# Patient Record
Sex: MALE | NOT HISPANIC OR LATINO | Employment: FULL TIME | URBAN - NONMETROPOLITAN AREA
[De-identification: names, ages, dates, MRNs, and addresses within clinical notes are randomized per-mention and may not be internally consistent; named-entity substitution may affect disease eponyms.]

---

## 2017-06-15 ENCOUNTER — HISTORY (OUTPATIENT)
Dept: EMERGENCY MEDICINE | Facility: OTHER | Age: 52
End: 2017-06-15

## 2017-06-23 ENCOUNTER — OFFICE VISIT - GICH (OUTPATIENT)
Dept: PEDIATRICS | Facility: OTHER | Age: 52
End: 2017-06-23

## 2017-06-23 ENCOUNTER — HISTORY (OUTPATIENT)
Dept: PEDIATRICS | Facility: OTHER | Age: 52
End: 2017-06-23

## 2017-06-23 DIAGNOSIS — N40.0 ENLARGED PROSTATE WITHOUT LOWER URINARY TRACT SYMPTOMS (LUTS): ICD-10-CM

## 2017-06-23 DIAGNOSIS — Z13.0 ENCOUNTER FOR SCREENING FOR DISEASES OF THE BLOOD AND BLOOD-FORMING ORGANS AND CERTAIN DISORDERS INVOLVING THE IMMUNE MECHANISM: ICD-10-CM

## 2017-06-23 DIAGNOSIS — R05.9 COUGH: ICD-10-CM

## 2017-06-23 DIAGNOSIS — Z13.1 ENCOUNTER FOR SCREENING FOR DIABETES MELLITUS: ICD-10-CM

## 2017-06-23 DIAGNOSIS — K21.9 GASTRO-ESOPHAGEAL REFLUX DISEASE WITHOUT ESOPHAGITIS: ICD-10-CM

## 2017-06-23 DIAGNOSIS — E78.2 MIXED HYPERLIPIDEMIA: ICD-10-CM

## 2017-06-23 DIAGNOSIS — Z76.89 PERSONS ENCOUNTERING HEALTH SERVICES IN OTHER SPECIFIED CIRCUMSTANCES: ICD-10-CM

## 2017-06-23 DIAGNOSIS — R09.89 OTHER SPECIFIED SYMPTOMS AND SIGNS INVOLVING THE CIRCULATORY AND RESPIRATORY SYSTEMS: ICD-10-CM

## 2017-06-23 DIAGNOSIS — F17.200 NICOTINE DEPENDENCE, UNCOMPLICATED: ICD-10-CM

## 2017-06-23 DIAGNOSIS — Z12.11 ENCOUNTER FOR SCREENING FOR MALIGNANT NEOPLASM OF COLON: ICD-10-CM

## 2017-06-23 LAB
ABSOLUTE BASOPHILS - HISTORICAL: 0 THOU/CU MM
ABSOLUTE EOSINOPHILS - HISTORICAL: 0.1 THOU/CU MM
ABSOLUTE IMMATURE GRANULOCYTES(METAS,MYELOS,PROS) - HISTORICAL: 0.1 THOU/CU MM
ABSOLUTE LYMPHOCYTES - HISTORICAL: 2.5 THOU/CU MM (ref 0.9–2.9)
ABSOLUTE MONOCYTES - HISTORICAL: 0.9 THOU/CU MM
ABSOLUTE NEUTROPHILS - HISTORICAL: 9.6 THOU/CU MM (ref 1.7–7)
ANION GAP - HISTORICAL: 8 (ref 5–18)
BASOPHILS # BLD AUTO: 0.3 %
BUN SERPL-MCNC: 18 MG/DL (ref 7–25)
BUN/CREAT RATIO - HISTORICAL: 21
CALCIUM SERPL-MCNC: 9.3 MG/DL (ref 8.6–10.3)
CHLORIDE SERPLBLD-SCNC: 107 MMOL/L (ref 98–107)
CHOL/HDL RATIO - HISTORICAL: 4.42
CHOLESTEROL TOTAL: 199 MG/DL
CO2 SERPL-SCNC: 24 MMOL/L (ref 21–31)
CREAT SERPL-MCNC: 0.86 MG/DL (ref 0.7–1.3)
EOSINOPHIL NFR BLD AUTO: 0.6 %
ERYTHROCYTE [DISTWIDTH] IN BLOOD BY AUTOMATED COUNT: 13 % (ref 11.5–15.5)
GFR IF NOT AFRICAN AMERICAN - HISTORICAL: >60 ML/MIN/1.73M2
GLUCOSE SERPL-MCNC: 98 MG/DL (ref 70–105)
HCT VFR BLD AUTO: 43.9 % (ref 37–53)
HDLC SERPL-MCNC: 45 MG/DL (ref 23–92)
HEMOGLOBIN: 14.6 G/DL (ref 13.5–17.5)
IMMATURE GRANULOCYTES(METAS,MYELOS,PROS) - HISTORICAL: 0.5 %
LDLC SERPL CALC-MCNC: 118 MG/DL
LYMPHOCYTES NFR BLD AUTO: 18.7 % (ref 20–44)
MCH RBC QN AUTO: 30.3 PG (ref 26–34)
MCHC RBC AUTO-ENTMCNC: 33.3 G/DL (ref 32–36)
MCV RBC AUTO: 91 FL (ref 80–100)
MONOCYTES NFR BLD AUTO: 6.9 %
NEUTROPHILS NFR BLD AUTO: 73 % (ref 42–72)
NON-HDL CHOLESTEROL - HISTORICAL: 154 MG/DL
PATIENT STATUS - HISTORICAL: ABNORMAL
PLATELET # BLD AUTO: 305 THOU/CU MM (ref 140–440)
PMV BLD: 9.6 FL (ref 6.5–11)
POTASSIUM SERPL-SCNC: 4.5 MMOL/L (ref 3.5–5.1)
PSA TOTAL (SCREEN) - HISTORICAL: 4.51 NG/ML
RED BLOOD COUNT - HISTORICAL: 4.82 MIL/CU MM (ref 4.3–5.9)
SODIUM SERPL-SCNC: 139 MMOL/L (ref 133–143)
TRIGL SERPL-MCNC: 180 MG/DL
WHITE BLOOD COUNT - HISTORICAL: 13.2 THOU/CU MM (ref 4.5–11)

## 2017-06-26 ENCOUNTER — COMMUNICATION - GICH (OUTPATIENT)
Dept: SURGERY | Facility: OTHER | Age: 52
End: 2017-06-26

## 2017-06-26 DIAGNOSIS — Z12.11 ENCOUNTER FOR SCREENING FOR MALIGNANT NEOPLASM OF COLON: ICD-10-CM

## 2017-07-05 ENCOUNTER — HOSPITAL ENCOUNTER (OUTPATIENT)
Dept: RESPIRATORY THERAPY | Facility: OTHER | Age: 52
End: 2017-07-05
Attending: INTERNAL MEDICINE

## 2017-07-05 DIAGNOSIS — R09.89 OTHER SPECIFIED SYMPTOMS AND SIGNS INVOLVING THE CIRCULATORY AND RESPIRATORY SYSTEMS: ICD-10-CM

## 2017-07-05 DIAGNOSIS — F17.200 NICOTINE DEPENDENCE, UNCOMPLICATED: ICD-10-CM

## 2017-07-05 DIAGNOSIS — R05.9 COUGH: ICD-10-CM

## 2017-07-20 ENCOUNTER — HOSPITAL ENCOUNTER (OUTPATIENT)
Dept: SURGERY | Facility: OTHER | Age: 52
Discharge: HOME | End: 2017-07-20
Attending: SURGERY | Admitting: SURGERY

## 2017-07-20 ENCOUNTER — SURGERY (OUTPATIENT)
Dept: SURGERY | Facility: OTHER | Age: 52
End: 2017-07-20

## 2017-07-20 ENCOUNTER — HISTORY (OUTPATIENT)
Dept: SURGERY | Facility: OTHER | Age: 52
End: 2017-07-20

## 2017-07-25 ENCOUNTER — COMMUNICATION - GICH (OUTPATIENT)
Dept: SURGERY | Facility: OTHER | Age: 52
End: 2017-07-25

## 2017-07-25 ENCOUNTER — HISTORY (OUTPATIENT)
Dept: SURGERY | Facility: OTHER | Age: 52
End: 2017-07-25

## 2017-12-28 NOTE — OR ANESTHESIA
"Patient Information     Patient Name MRN Sex     Marvin Chavarria [id number] Male 1965      OR Anesthesia by Alysa Rosas CRNA at 2017 11:53 AM     Author:  Alysa Rosas CRNA Service:  (none) Author Type:  NURS- Nurse Anesthetist     Filed:  2017 11:54 AM Date of Service:  2017 11:53 AM Status:  Signed     :  Alysa Rosas CRNA (NURS- Nurse Anesthetist)            Anesthesia Post Operative Care Note    Name: Marvin Chavarria  MRN:   [id number]  :    1965       Procedure Done:  See Surgeon Note   Case Cancelled for Anesthetic Reason:  No      Anesthesia Technique    Anesthetic Type:  MAC       MAC Type:  NC     Oral Trauma:  No    Intraoperative Course   Hemodynamics:  Stable  Post Op Heart:  No Post Op Heart        Echocardiogram (if \"Yes\" - see report):  No    Ventilation Normal:  Yes Lung Sounds:  Normal      PACU Course      Nondepolarizer Used: No    Reintubation:  No   Hemodynamics:  Stable      Hydration: Euvolemic   Temperature:  36.1 - 38.3      Mental Status:  Awake, alert, follows commands   Pain Management:  Adequate   Regional Block:  No   Anesthesia Complications:  None      Vital Signs:  Temp: (P) 98  F (36.7  C)  Pulse: 98  BP: 131/75  Resp: 18  SpO2: 97 %                       Active Lines:  Patient Lines/Drains/Airways Status    Active Line     None                Intake & Output:  Date  17 07 - 17 0659(Not Admitted)    17 07 - 17 0659      Shift  3322-8934 6080-9957 2193-2175 24 Hour Total 1069-9088 1352-5499 9505-5448 24 Hour Total   I  N  T  A  K  E   Shift Total           O  U  T  P  U  T   Other     200   200       +I/O+    Liquid Stool     200   200    Shift Total     200   200   NET      -200   -200   Weight (kg)                  Labs:  No results for input(s): JB1JACHGZAM, VKU3AQKEAHTL, PHARTERIAL, BSQ5DQBZJEBK, O8GKXUDIRCRM in the last 24 hours.    No results for input(s): MAGNESIUM in the last 24 hours.    No results " for input(s): GLUCOSEMETER in the last 720 hours.        Alysa Rosas CRNA ....................  7/20/2017   11:53 AM

## 2017-12-28 NOTE — OR POSTOP
Patient Information     Patient Name MRN Sex Marvin Macario [id number] Male 1965      OR PostOp by Erin Sotelo RN at 2017 12:20 PM     Author:  Erin Sotelo RN Service:  (none) Author Type:  NURS- Registered Nurse     Filed:  2017 12:21 PM Date of Service:  2017 12:20 PM Status:  Signed     :  Erin Sotelo RN (NURS- Registered Nurse)            Discharge Note    Data:  Marvin Chavarria has been discharged home at 1220 via ambulatory accompanied by Registered Nurse.      Action:  Written discharge/follow-up instructions were provided to patient. Prescriptions : None.  Belongings sent with patient. Medications from home sent with patient/family: Not Applicable  Equipment none .     Response:  Patient verbalized understanding of discharge instructions, reason for discharge, and necessary follow-up appointments.       Erin Sotelo RN

## 2017-12-28 NOTE — PROCEDURES
Patient Information     Patient Name MRN Sex Marvin Macario [id number] Male 1965      Procedures by Rachel Cardona MD at 2017 11:47 AM     Author:  Rachel Cardona MD Service:  (none) Author Type:  Physician     Filed:  2017 11:53 AM Date of Service:  2017 11:47 AM Status:  Signed     :  Rachel Cardona MD (Physician)        Pre-procedure Diagnoses:    1. Special screening for malignant neoplasms, colon [Z12.11]           Post-procedure Diagnoses:    1. Polyp of cecum [D12.0]    2. Polyp of transverse colon, unspecified type [D12.3]    3. Polyp of sigmoid colon, unspecified type [D12.5]    4. Diverticulosis of colon without diverticulitis [K57.30]           Procedures:    1. VT COLONOSCOPY REMOVE KEITH POLYP LESN HOT BPSY FORCEP [75353.0]               PROCEDURE NOTE    SURGEON: Rachel Cardona MD.    PRE-OP DIAGNOSIS:  Screening Colonoscopy      POST-OP DIAGNOSIS: colon polyps -cecum, hepatic flexure and sigmoid colon    PROCEDURE:  Colonoscopy with polypectomies with hot forceps    ESTIMATED BLOOD LOSS: none    COMPLICATIONS:  None    SPECIMEN:  Cecal, hepatic flexure and sigmoid polyps    ANESTHESIA:  See anesthesia note    INDICATION FOR THE PROCEDURE: The patient is a 51 y.o. male. The patient has no complaints. I explained to the patient the risks, benefits and alternatives to screening colonoscopy for evaluating the colon for colon polyps and colon cancer. We specifically discussed the risks of bleeding, infection, perforation, potential inability to reach the cecum and the risks of sedation. The patient's questions were answered and the patient wished to proceed. Informed consent paperwork was completed.    PROCEDURE: The patient was taken to the endoscopy suite. Appropriate monitors were attached. The patient was placed in the left lateral decubitus position.Timeout was performed confirming the patient's identity and procedure to be performed.  After appropriate sedation was  confirmed, digital rectal exam was performed.  There was normal tone and no gross abnormality was noted. The lubricated colonoscope was introduced into the anus the colon was insufflated with air. The prep quality was adequate. Under direct visualization the scope was advanced to the cecum. The ileocecal valve was intubated and the terminal ileum inspected. No gross abnormality was noted. The scope was withdrawn back into the cecum. Three small flat polyps were noted in the cecum and removed with hot forceps. The mucosa of colon was inspected while withdrawing the scope. Two less than 1 cm flat polyps were noted at the hepatic flexure and removed with hot forceps. A small polyp-less than 5 mm  was noted in the sigmoid colon and removed with hot forceps. The scope was retroflexed in the rectum and the anorectal junction was inspected. No abnormalities were noted. The scope was returned to a neutral position and the colon was decompressed. The scope was removed. The patient tolerated the procedure with no immediately apparent complication. The patient was taken to recovery in stable condition.      FOLLOW UP:  RECOMMEND high fiber diet, follow up: will call with pathology results.    Rachel Cardona MD

## 2017-12-28 NOTE — PROGRESS NOTES
Patient Information     Patient Name MRN Marvin Jimenez [id number] Male 1965      Progress Notes by Lacho Lizama MD at 2017  2:00 PM     Author:  Lacho Lizama MD Service:  (none) Author Type:  Physician     Filed:  2017  4:53 PM Encounter Date:  2017 Status:  Signed     :  Lacho Lizama MD (Physician)            Preventive Exam      SUBJECTIVE:    Mr. Marvin Chavarria is a 51 y.o. male who presents for a routine preventive physical exam.  He has no symptomatic complaints or other concerns.    Preventive Health Assessment:    Preventive Health screen was reviewed and updated today in EMR  Calcium intake is adequate.     Exercise is adequate.  Seatbelts are used at all times.  Tetanus immunization is up-to-date.  Colon cancer screening is due.    Review of Systems:  Constitutional: Normal  Eyes: Normal  Ears/nose/mouth/throat: Normal  Cardiology/vascular: Normal  Respiratory: Normal  Psychiatric: Normal  GI: He gets heartburn off and on. Again he attributes this to coffee. He's been taking Pepcid over-the-counter which has been helpful.  : He gets up to urinate 2-3 times per night. He's always attributed to drinking 2 pots of coffee. He works night shift so his day night schedule is unusual. When he first wakes up it can be hard to urinate. His stream has decreased particularly when he wakes up.  Musculoskeletal: Normal  Neurological: Normal  Endocrine: Normal  Hematological/lymphatic: Normal  Integumentary: He recently lacerated his ring finger while working with his boat. He was seen in the ER and had it sutured. He is not due to have the sutures removed until next week, he plans to come back for that. Is no drainage or erythema.  Allergy/immunizations: Normal      Past Medical History:    Patient Active Problem List     Diagnosis  Code     Gastroesophageal reflux disease K21.9     Bilateral carotid bruits R09.89     Tobacco use disorder F17.200     Benign prostatic  "hyperplasia N40.0     Past Medical History:     Diagnosis  Date     Nicotine use disorder      Past Surgical History:    Past Surgical History:      Procedure  Laterality Date     WISDOM TEETH EXTRACTION  1981       Allergies     Allergen  Reactions     Ibuprofen Chest Pain       Current Outpatient Prescriptions on File Prior to Visit       Medication  Sig Dispense Refill     ASCORBATE CALCIUM (VITAMIN C ORAL) Take  by mouth.       HYDROcodone-acetaminophen, 5-325 mg, (NORCO) per tablet Take 1-2 tablets by mouth every 6 hours if needed  for Pain Max acetaminophen dose: 4000 mg in 24 hrs. 30 tablet 0     multivitamin capsule Take 1 capsule by mouth once daily.       ZINC ORAL Take  by mouth.       No current facility-administered medications on file prior to visit.        Family History:   Family History       Problem   Relation Age of Onset     Other  Father      BPH       Heart Disease  Father 85     AMI       Dementia  Mother      Arthritis  Mother      Other  Brother      enlarged prostate       Other  Brother      x3 with BPH         Diabetes  Brother      Cancer-prostate  No Family History      Cancer-colon  No Family History        Social History:    Social History      Substance Use Topics        Smoking status:  Current Every Day Smoker     Packs/day: 1.00     Types: Cigarettes     Smokeless tobacco:  Never Used     Alcohol use  No     Social History Narrative    , 2 children    works nights at the "[employer] Yuko Chavarria        OBJECTIVE:    /82  Pulse 90  Ht 1.626 m (5' 4\")  Wt 69.3 kg (152 lb 12.8 oz)  BMI 26.23 kg/m2 Body mass index is 26.23 kg/(m^2).  Gen: Pleasant male, NAD.  HEENT: MMM, no OP erythema.  Neck: Bilateral carotid bruits, no thyromegaly.  No cervical lymphadenopathy.  CV: RRR no m/r/g.   Pulm: CTAB no w/r/r  Abd: Soft, NT, ND. No HSM. No masses. Bowel sounds present.  : circumcised adult male.  Testicles non-tender, descended bilat.  No masses. No inguinal " hernias.  Neuro: Grossly intact  Ext: No lower extremity edema.  Skin: No concerning lesions.  Psychiatric: Normal affect and insight. Does not appear anxious or depressed.    Diabetes Labs  Lab Results      Component  Value Date/Time    CHOL 199 06/23/2017 02:42 PM    HDL 45 06/23/2017 02:42 PM    LDLCHOL 118 (H) 06/23/2017 02:42 PM    TRIGLYCERIDE 180 (H) 06/23/2017 02:42 PM    CREATININE 0.86 06/23/2017 02:42 PM         ASSESSMENT:      ICD-10-CM    1. Encounter to establish care Z76.89    2. Benign prostatic hyperplasia, presence of lower urinary tract symptoms unspecified, unspecified morphology N40.0 PSA TOTAL SCREEN      tamsulosin (FLOMAX) 0.4 mg capsule      PSA TOTAL SCREEN   3. Mixed hyperlipidemia E78.2 LIPID PANEL      LIPID PANEL   4. Diabetes mellitus screening Z13.1 BASIC METABOLIC PANEL      BASIC METABOLIC PANEL   5. Screening, iron deficiency anemia Z13.0 CBC WITH DIFFERENTIAL      CBC WITH DIFFERENTIAL      CBC WITH AUTO DIFFERENTIAL   6. Tobacco use disorder F17.200 SPIROMETRY W/ BRONCHODILATOR   7. Colon cancer screening Z12.11 COLONOSCOPY SCREENING-GICH   8. Gastroesophageal reflux disease, esophagitis presence not specified K21.9 famotidine (PEPCID) 10 mg tablet   9. Bilateral carotid bruits R09.89 US CAROTID DUPLEX BILATERAL   10. Cough R05 SPIROMETRY W/ BRONCHODILATOR     strongly encouraged tobacco cessation. Some information was provided below. Requested he come back for repeat visit if he desires tobacco cessation.    PLAN:     -- Screening labs ordered (see above)   -- Colonoscopy: Schedule   -- Prostate: PSA today   -- Discussed healthy eating, active lifestyle   -- Medications discussed   -- Follow-up in one year, sooner PRN any concerns.  Patient Instructions    -- Think about quitting smoking   -- Daily exercise   -- Okay to use famotidine   -- Try to cut back on caffeine   -- Trial of Flomax if PSA is roughly the same         -- Choose a quit date (within 1 month). Quitting smoking  abruptly is more successful than gradually cutting back.   -- Tell everyone about it (friends, family, coworkers)   -- Think about when you smoke the most, and what you'll do during those times (eg when in the car, work breaks, etc)     -- Start varenicline (Chantix) 1 week before your quit date   -- Start bupropion (Wellbutrin/Zyban) 1 week before your quit date -- Welbutrin 1 pill daily for 1 week then 1 pill twice daily     -- Stop smoking on quit date   -- Starting with quit date, use nicotine lozenges/gum as needed for cravings   -- Quit Plan   [phone] ([phone])   http://www.quitplan.com   -- http://smokefree.gov/      SignedLacho MD  Internal Medicine & Pediatrics

## 2017-12-28 NOTE — PATIENT INSTRUCTIONS
Patient Information     Patient Name MRMarvin Maldonado [id number] Male 1965      Patient Instructions by Lacho Lizama MD at 2017  2:00 PM     Author:  Lacho Lizama MD  Service:  (none) Author Type:  Physician     Filed:  2017  2:29 PM  Encounter Date:  2017 Status:  Addendum     :  Lacho Lizama MD (Physician)        Related Notes: Original Note by Lacho Lizama MD (Physician) filed at 2017  2:26 PM             -- Think about quitting smoking   -- Daily exercise   -- Okay to use famotidine   -- Try to cut back on caffeine   -- Trial of Flomax if PSA is roughly the same         -- Choose a quit date (within 1 month). Quitting smoking abruptly is more successful than gradually cutting back.   -- Tell everyone about it (friends, family, coworkers)   -- Think about when you smoke the most, and what you'll do during those times (eg when in the car, work breaks, etc)     -- Start varenicline (Chantix) 1 week before your quit date   -- Start bupropion (Wellbutrin/Zyban) 1 week before your quit date -- Welbutrin 1 pill daily for 1 week then 1 pill twice daily     -- Stop smoking on quit date   -- Starting with quit date, use nicotine lozenges/gum as needed for cravings   -- Quit Plan   [phone] ([phone])   http://www.quitplan.com   -- http://smokefree.gov/

## 2017-12-28 NOTE — OR NURSING
Patient Information     Patient Name MRN Sex Marvin Macario [id number] Male 1965      OR Nursing by Rakel Wilson RN at 2017 11:12 AM     Author:  Rakel Wilson RN Service:  (none) Author Type:  NURS- Registered Nurse     Filed:  2017 11:12 AM Date of Service:  2017 11:12 AM Status:  Signed     :  Rakel Wilson RN (NURS- Registered Nurse)            Received preop report from Devi Vargas RN.

## 2017-12-28 NOTE — TELEPHONE ENCOUNTER
Patient Information     Patient Name MRN Sex Marvin Macario [id number] Male 1965      Telephone Encounter by Layla Bird at 2017  9:07 AM     Author:  Layla Bird Service:  (none) Author Type:  (none)     Filed:  2017  9:12 AM Encounter Date:  2017 Status:  Signed     :  Layla Bird            Screening Questions for the Scheduling of Screening Colonoscopies   (If Colonoscopy is diagnostic, Provider should review the chart before scheduling.)  Are you younger than 50 or older than 80?  NO   Do you take aspirin or fish oil?  NO (if yes, tell patient to stop 1 week prior to Colonoscopy)  Do you take warfarin (Coumadin), clopidogrel (Plavix), apixaban (Eliquis), dabigatram (Pradaxa), rivaroxaban (Xarelto) or any blood thinner? NO  Do you use oxygen at home?  NO  Do you have kidney disease? NO  Are you on dialysis? NO  Have you had a stroke or heart attack in the last year? NO  Have you had a stent in your heart or any blood vessel in the last year? NO  Have you had a transplant of any organ? NO  Have you had a colonoscopy or upper endoscopy (EGD) before? NO          When?  NO  Date of scheduled Colonoscopy. 2017  Provider JULIO HALE

## 2017-12-28 NOTE — OR ANESTHESIA
Patient Information     Patient Name MRN Sex     Marvin Chavarria [id number] Male 1965      OR Anesthesia by Alysa Rosas CRNA at 2017 11:04 AM     Author:  Alysa Rosas CRNA Service:  (none) Author Type:  NURS- Nurse Anesthetist     Filed:  2017 11:04 AM Date of Service:  2017 11:04 AM Status:  Signed     :  Alysa Rosas CRNA (NURS- Nurse Anesthetist)                                                           ANESTHESIA ASSESSMENT    Date: 17 Time: 11:04 AM      Patient:  Marvin Chavarria    Procedure(s) (LRB):  COLONOSCOPY (N/A)    Past Medical History:     Diagnosis  Date     Nicotine use disorder        Past Surgical History:      Procedure  Laterality Date     WISDOM TEETH EXTRACTION         Family History       Problem   Relation Age of Onset     Other  Father      BPH       Heart Disease  Father 85     AMI       Dementia  Mother      Arthritis  Mother      Other  Brother      enlarged prostate       Other  Brother      x3 with BPH         Diabetes  Brother      Cancer-prostate  No Family History      Cancer-colon  No Family History        Patient Active Problem List     Diagnosis  Code     Gastroesophageal reflux disease K21.9     Bilateral carotid bruits R09.89     Tobacco use disorder F17.200     Benign prostatic hyperplasia N40.0     Special screening for malignant neoplasms, colon Z12.11       Prescriptions Prior to Admission       Medication  Sig Dispense Refill     ASCORBATE CALCIUM (VITAMIN C ORAL) Take  by mouth.       famotidine (PEPCID) 10 mg tablet Take 1 tablet by mouth 2 times daily.  0     multivitamin capsule Take 1 capsule by mouth once daily.       tamsulosin (FLOMAX) 0.4 mg capsule Take 1 capsule by mouth once daily after a meal. 90 capsule 4     ZINC ORAL Take  by mouth.         Allergies:  Allergies     Allergen  Reactions     Ibuprofen Chest Pain       Review of Systems:  GERD: Yes (controlled with mes)  Chest pain: No  Shortness of breath:  No  Recent fever: No  Poor exercise tolerance: No  Bleeding tendency: No  Pregnant: No  Anesthesia Complications: None      History     Smoking Status       Current Every Day Smoker      Packs/day: 1.00     Types: Cigarettes   Smokeless Tobacco       Never Used      Social History     Social History        Marital status:       Spouse name: Yuko     Number of children:  2     Years of education:  12+     Occupational History       supervisor [employer]     Social History Main Topics        Smoking status:  Current Every Day Smoker     Packs/day: 1.00     Types: Cigarettes     Smokeless tobacco:  Never Used     Alcohol use  No     Drug use:  No     Sexual activity:  Yes     Partners: Female     Other Topics  Concern     None      Social History Narrative     , 2 children    works nights at the [employer]    [place] Chavarria       Physical Examination:  /75  Pulse 98  Temp 98.6  F (37  C)  Resp 18  SpO2 97% There is no height or weight on file to calculate BMI. There is no height or weight on file to calculate BSA.  Dental Condition: Good     Mallampati Score (Airway): II  Cardiovascular: Normal  Pulmonary: Normal  Other: N/A    Recent Labs in [facility]:    No results for input(s): SODIUM, POTASSIUM, CHLORIDE, QS3SPBBT, ANIONGAP, BUN, CREATININE, BUNCREARATIO, CALCIUM, GLUCOSE, GLUCOSEMETER, KETONES, MAGNESIUM, WBC, HGB, HCT, PLT, ABORH, RHTYPE, PREGURINE, BHCGQL, HCGBETAQUANT, INR in the last 72 hours.          Assessment/Plan:  ASA Class: II  Risk of dental injury discussed: Yes  NPO status confirmed: Yes  Anesthetic Plan: MAC  Risk/Benefit/Alt discussed: Yes  Questions answered: Yes  Emergency Case?: No  Labs/ECG/Radiology Reviewed?: Yes      H&P Reviewed.  Patient Examined.      Provider Electronic Signature:  Alysa Rosas CRNA

## 2017-12-29 NOTE — H&P
Patient Information     Patient Name MRN Sex Marvin Macario [id number] Male 1965      H&P by Rachel Cardona MD at 2017 10:59 AM     Author:  Rachel Cardona MD Service:  (none) Author Type:  Physician     Filed:  2017 11:00 AM Date of Service:  2017 10:59 AM Status:  Signed     :  Rachel Cardona MD (Physician)            PRE-PROCEDURE NOTE    CHIEF COMPLAINT / REASON FOR PROCEDURE:  Need for screening colonoscopy.    PERTINENT HISTORY   Patient with no complaints. Previous colonoscopy none. No diarrhea, constipation, abdominal pain or rectal bleeding. No family history of colon polyps or colon cancer.    Past Medical History:     Diagnosis  Date     Nicotine use disorder      Past Surgical History:      Procedure  Laterality Date     WISDOM TEETH EXTRACTION       Other:  None  Bleeding tendencies:  No    Relevant Family History:  None    Relevant Social History:  None    A relevant review of systems was performed and was negative.    ALLERGIES/SENSITIVITIES:   Allergies     Allergen  Reactions     Ibuprofen Chest Pain        CURRENT MEDICATIONS:    Current Facility-Administered Medications        Medication  Dose Route Frequency Last Rate     lactated Ringers infusion  25 mL/hr Intravenous continuous 25 mL/hr (17 1030)     lidocaine (1%) injection 0.1-1 mL  0.1-1 mL Intra-Dermal one time prn       sodium chloride 0.9% 5 mL syringe (NORMAL SALINE)  5 mL Intravenous Each Time PRN        Prior to Admission medications          Medication Sig Start Date End Date Taking? Last Dose Authorizing Provider   ASCORBATE CALCIUM (VITAMIN C ORAL) Take  by mouth.    17 am Reported, Patient   famotidine (PEPCID) 10 mg tablet Take 1 tablet by mouth 2 times daily. 17 at 0700 Lacho Lizama MD   multivitamin capsule Take 1 capsule by mouth once daily.    17 am Reported, Patient   tamsulosin (FLOMAX) 0.4 mg capsule Take 1 capsule by mouth once daily after a meal.  6/23/17 7/19/2017 at 2000 Lacho Lizama MD   ZINC ORAL Take  by mouth.    7/13/17 am Reported, Patient       PRE-SEDATION ASSESSMENT:    Lung Exam:  Normal  Heart Exam:  Normal    Comment(s):      IMPRESSION:  Need for screening colonoscopy.    PLAN:  I discussed screening colonoscopy with the patient.    Rachel Cardona MD

## 2017-12-30 NOTE — NURSING NOTE
Patient Information     Patient Name MRN Marvin Jimenez [id number] Male 1965      Nursing Note by Evette Fritz at 2017  2:00 PM     Author:  Evette Fritz Service:  (none) Author Type:  (none)     Filed:  2017  2:15 PM Encounter Date:  2017 Status:  Signed     :  Evette Fritz            Patient presents to clinic to establish care today.  Has concerns with elevated PSA levels over the last few years and for over a month.  Evette Fritz LPN ....................  2017   2:08 PM

## 2018-01-27 VITALS
HEART RATE: 90 BPM | HEIGHT: 64 IN | BODY MASS INDEX: 26.09 KG/M2 | WEIGHT: 152.8 LBS | SYSTOLIC BLOOD PRESSURE: 130 MMHG | DIASTOLIC BLOOD PRESSURE: 82 MMHG

## 2018-02-01 ENCOUNTER — HISTORY (OUTPATIENT)
Dept: FAMILY MEDICINE | Facility: OTHER | Age: 53
End: 2018-02-01

## 2018-02-01 ENCOUNTER — OFFICE VISIT - GICH (OUTPATIENT)
Dept: FAMILY MEDICINE | Facility: OTHER | Age: 53
End: 2018-02-01

## 2018-02-01 DIAGNOSIS — R50.9 FEVER: ICD-10-CM

## 2018-02-01 DIAGNOSIS — R69 ILLNESS: ICD-10-CM

## 2018-02-01 DIAGNOSIS — J10.1 INFLUENZA DUE TO OTHER IDENTIFIED INFLUENZA VIRUS WITH OTHER RESPIRATORY MANIFESTATIONS: ICD-10-CM

## 2018-02-09 VITALS
WEIGHT: 154.4 LBS | HEART RATE: 86 BPM | RESPIRATION RATE: 20 BRPM | BODY MASS INDEX: 26.36 KG/M2 | TEMPERATURE: 98.6 F | SYSTOLIC BLOOD PRESSURE: 140 MMHG | HEIGHT: 64 IN | DIASTOLIC BLOOD PRESSURE: 80 MMHG

## 2018-02-13 NOTE — PROGRESS NOTES
Patient Information     Patient Name MRN Sex Marvin Macario [id number] Male 1965      Progress Notes by Lillie Sandoval NP at 2018  1:30 PM     Author:  Lillie Sandoval NP Service:  (none) Author Type:  PHYS- Nurse Practitioner     Filed:  2018  2:44 PM Encounter Date:  2018 Status:  Signed     :  Lillie Sandoval NP (PHYS- Nurse Practitioner)            Nursing Notes:   Patricia Ross  2018  2:29 PM  Signed  Patient presents to the clinic for cough and fever that started yesterday. Fevers have been around 101. Took some Tylenol this morning at 1100.  Patricia Ross LPN............................ 2018 2:04 PM     SUBJECTIVE:    Marvin Chavarria is a 52 y.o. male who presents for uri, cough fever.     URI    This is a new problem. The current episode started yesterday. The problem has been gradually worsening. The maximum temperature recorded prior to his arrival was 101 - 101.9 F. The fever has been present for 1 to 2 days. Associated symptoms include congestion, coughing, headaches, a plugged ear sensation and rhinorrhea. Pertinent negatives include no ear pain, rash, sneezing, sore throat, swollen glands, vomiting or wheezing. Associated symptoms comments: He did not get a flu shot this year. . He has tried acetaminophen for the symptoms. The treatment provided mild relief.       Current Outpatient Prescriptions on File Prior to Visit       Medication  Sig Dispense Refill     ASCORBATE CALCIUM (VITAMIN C ORAL) Take  by mouth.       famotidine (PEPCID) 10 mg tablet Take 1 tablet by mouth 2 times daily.  0     multivitamin capsule Take 1 capsule by mouth once daily.       tamsulosin (FLOMAX) 0.4 mg capsule Take 1 capsule by mouth once daily after a meal. 90 capsule 4     ZINC ORAL Take  by mouth.       No current facility-administered medications on file prior to visit.        REVIEW OF SYSTEMS:  Review of Systems   HENT: Positive for congestion and  "rhinorrhea. Negative for ear pain, sneezing and sore throat.    Respiratory: Positive for cough. Negative for wheezing.    Gastrointestinal: Negative for vomiting.   Skin: Negative for rash.   Neurological: Positive for headaches.       OBJECTIVE:  /80 (Cuff Site: Left Arm, Position: Sitting, Cuff Size: Adult Regular)  Pulse 86  Temp 98.6  F (37  C) (Tympanic)  Resp 20  Ht 1.626 m (5' 4\")  Wt 70 kg (154 lb 6.4 oz)  BMI 26.5 kg/m2    EXAM:   Physical Exam   Constitutional: He is well-developed, well-nourished, and in no distress.   HENT:   Head: Normocephalic and atraumatic.   Right Ear: Tympanic membrane and ear canal normal.   Left Ear: Tympanic membrane and ear canal normal.   Nose: Rhinorrhea present.   Mouth/Throat: Uvula is midline, oropharynx is clear and moist and mucous membranes are normal.   Eyes: Conjunctivae are normal.   Neck: Neck supple.   Cardiovascular: Normal rate, regular rhythm and normal heart sounds.    Pulmonary/Chest: Effort normal and breath sounds normal. No respiratory distress. He has no decreased breath sounds. He has no wheezes. He has no rhonchi. He has no rales.   Dry cough noted.    Lymphadenopathy:     He has no cervical adenopathy.   Skin: Skin is warm and dry.   Psychiatric: Mood and affect normal.   Nursing note and vitals reviewed.      ASSESSMENT/PLAN:    ICD-10-CM    1. Fever, unspecified fever cause R50.9 [facility] ONLY INFLUENZA A/B PCR   2. Influenza-like illness R69         Plan:  Lab will be called to patient if he has flu he wants [facility]. Viral illness gone over. Home cares and OTC gone over.       BARBARA ELAM NP ....................  2/1/2018   2:43 PM          "

## 2018-02-13 NOTE — PATIENT INSTRUCTIONS
Patient Information     Patient Name MRN Marvin Jimenez [id number] Male 1965      Patient Instructions by Lillie Sandoval NP at 2018  1:30 PM     Author:  Lillie Sandoval NP Service:  (none) Author Type:  PHYS- Nurse Practitioner     Filed:  2018  2:33 PM Encounter Date:  2018 Status:  Signed     :  Lillie Sandoval NP (PHYS- Nurse Practitioner)            Flu (Influenza)   ________________________________________________________________________  KEY POINTS    Flu is caused by a virus. It can be spread by coughing or sneezing, or by touching something with the virus on it.    The flu vaccine is the best way to help prevent the flu. Washing your hands often is also important. Treatment includes rest, drinking plenty of liquids, and sometimes medicine for pain or fever.    If you have another medical problem and get the flu, it s best to see your healthcare provider.  ________________________________________________________________________  What is flu?  Influenza, also called the flu, is an infection caused by a virus. The flu affects your whole body, especially your air passages, and causes symptoms that are similar to cold symptoms. Flu symptoms tend to be worse than cold symptoms, but it can sometimes be hard to tell the difference between the flu and a cold unless you get a test.  Infection with the flu virus sometimes leads to other infections such as ear, sinus, and chest infections. Pneumonia can also occur as a result of the flu. It can be caused by the flu virus itself or by bacteria infecting lung tissues that have been damaged by the virus. Older adults; people whose immune systems are weak; and people with chronic medical problems, such as heart or lung disease or diabetes, are at risk for more severe symptoms or problems. This is why it s important to try to prevent flu by getting flu shots every year.  What is the cause?  Flu is caused by a virus. When you  have the flu, the virus is in your mucus and saliva and can spread to others when you cough or sneeze. People can also get the flu if they touch something with the flu virus on it (like cups, doorknobs, and hands) and then touch their mouth, nose, or eyes.  Outbreaks of flu occur every year, usually in late fall and winter.  What are the symptoms?  Flu tends to start suddenly. You may feel fine one hour and feel sick the next. Flu symptoms may be different from person to person. Some of the common symptoms include:    Chills, sweating, and fever    Muscle or body aches    Tiredness    Runny or stuffy nose    Headache    Sore throat    Cough  Flu symptoms usually last 3 to 7 days. You may start feeling better after the first 2 days or so.  How is it diagnosed?  Your healthcare provider will ask about your symptoms and examine you. The diagnosis is usually based on your symptoms. There are lab tests for flu, but in most cases there is no need to do a test, especially when many others in your community are sick with the flu.  How is it treated?  Usually you can treat your symptoms at home.    Get plenty of rest.    Drink a lot of clear liquids. Water, broth, juice, electrolyte solutions, and noncaffeinated drinks are best. When you have a high fever, your body needs more liquid because you lose more water in your breath and from your skin. Having enough fluids also helps the mucus in your sinuses and lungs stay thin and easy to clear from the body. When the mucus is thin, it is less likely to cause a sinus or chest infection.    Consider taking acetaminophen or ibuprofen to relieve headaches and muscle aches and to lower a fever. Read the label and take as directed. Unless recommended by your healthcare provider, you should not take these medicines for more than 10 days.    Nonsteroidal anti-inflammatory medicines (NSAIDs), such as ibuprofen, naproxen, and aspirin, may cause stomach bleeding and other problems. These  risks increase with age.    Acetaminophen may cause liver damage or other problems. Unless recommended by your provider, don't take more than 3000 milligrams (mg) in 24 hours. To make sure you don t take too much, check other medicines you take to see if they also contain acetaminophen. Ask your provider if you need to avoid drinking alcohol while taking this medicine.    If your nose or sinuses get congested, a decongestant medicine may help you feel better. Taking a decongestant may help prevent ear or sinus infections.    Cough medicine or cough drops may temporarily help control a cough.  Antiviral medicine is medicine your healthcare provider can prescribe that may make flu symptoms less severe. It may also help you feel better a little sooner. The medicine can be taken as a tablet or nasal spray. It helps only if you start taking it within the first 2 days of illness. Usually it is taken for only a few days. Even if you are taking antiviral medicine, you can pass the flu virus to other people. It is still important to wash your hands often and cover your mouth and nose when you cough or sneeze.  Your healthcare provider may prescribe antiviral medicine if you aren t sick yet but have been exposed to the flu and have not had the flu vaccine.  Talk to your healthcare provider right away if you have symptoms of the flu and:    You have heart disease, asthma, chronic bronchitis, kidney disease, diabetes, or another chronic medical problem    Your immune system does not work normally such as because you are taking steroid medicine for a medical problem    Your symptoms get worse, you have a painful cough, you are coughing up mucus, or you are having trouble breathing. These symptoms can be signs of pneumonia.  Ask your healthcare provider:    How and when you will get your test results    How long it will take to recover from this illness    If there are activities you should avoid, and when you can return to your  normal activities    How to take care of yourself at home    What symptoms or problems you should watch for and what to do if you have them  Make sure you know when you should come back for a checkup. Keep all appointments for provider visits or tests.  How can I help prevent flu?  The flu vaccine is the best way to help prevent the flu. If you do get the flu, the vaccine may help keep you from getting really sick. The flu vaccine is recommended for adults and children 6 months and older. It s especially important for those with a chronic illness.  The flu vaccine is given as a shot in the arm. The nasal spray is not recommended for the 8968-1945 flu season because it has not prevented the disease for the last 3 years.  The shot contains killed virus and is safe for everyone age 6 months and older.  You should get a new flu shot every year because the vaccine wears off over time and because it is changed each year to protect against the current year s most likely flu strains. It s best to get the new vaccine as soon as it s available each year before the start of flu season. However, if the vaccine is still available, it can be helpful to get it anytime during the flu season. Flu season usually starts in October and can last through May.  Flu seasons can vary from region to region. If you are at high risk for infection and plan to travel to an area where you might be exposed to the flu, make sure you have an up-to-date flu shot before you go on your trip.  Other things you can do to help avoid getting the flu are:    Wash your hands often with soap and water. Wash for 20 seconds (long enough to sing the whole  Happy Birthday  song) or use an alcohol-based hand .    Avoid touching your eyes, nose, or mouth when you are out in public.    Stay at least 6 feet away from people who are sick, if you can.    Try to take good care of yourself: Get plenty of sleep, be physically active, manage your stress, drink  plenty of fluids, and eat healthy food. Stop smoking.    Keep surfaces clean--especially bedside tables, surfaces in the bathroom, and toys for children. Some viruses and bacteria can live 2 hours or more on surfaces like cafeteria tables, doorknobs, and desks. Wipe them down with a household disinfectant according to directions on the label.  If you are sick, you can help protect others if you:    Don t go to work or school. Avoid contact with other people except to get medical care.    Cover your nose and mouth with a tissue when you cough or sneeze. Throw the tissue in the trash after you use it, and then wash your hands. If you don t have a tissue, cough or sneeze into your upper sleeve instead of your hands.    Clean your hands often with soap and water or an alcohol-based hand , especially after using tissues or coughing or sneezing into your hands.

## 2018-02-13 NOTE — NURSING NOTE
Patient Information     Patient Name MRN Sex Marvin Macario [id number] Male 1965      Nursing Note by Patricia Ross at 2018  1:30 PM     Author:  Patricia Ross Service:  (none) Author Type:  NURS- Student Practical Nurse     Filed:  2018  2:29 PM Encounter Date:  2018 Status:  Signed     :  Patricia Ross (NURS- Student Practical Nurse)            Patient presents to the clinic for cough and fever that started yesterday. Fevers have been around 101. Took some Tylenol this morning at 1100.  Patricia Ross LPN............................ 2018 2:04 PM

## 2018-02-13 NOTE — ADDENDUM NOTE
Patient Information     Patient Name MRN Marvin Jimenez [id number] Male 1965      Addendum Note by Barbara Sandoval NP at 2018  2:53 PM     Author:  Barbara Sandoval NP Service:  (none) Author Type:  PHYS- Nurse Practitioner     Filed:  2018  2:53 PM Encounter Date:  2018 Status:  Signed     :  Barbara Sandoval NP (PHYS- Nurse Practitioner)       Addended by: BARBARA SANDOVAL on: 2018 02:53 PM        Modules accepted: Orders

## 2018-02-23 ENCOUNTER — DOCUMENTATION ONLY (OUTPATIENT)
Dept: FAMILY MEDICINE | Facility: OTHER | Age: 53
End: 2018-02-23

## 2018-02-23 PROBLEM — N40.0 BENIGN PROSTATIC HYPERPLASIA: Status: ACTIVE | Noted: 2017-06-23

## 2018-02-23 PROBLEM — Z12.11 SPECIAL SCREENING FOR MALIGNANT NEOPLASMS, COLON: Status: ACTIVE | Noted: 2017-07-19

## 2018-02-23 PROBLEM — K21.9 GASTROESOPHAGEAL REFLUX DISEASE: Status: ACTIVE | Noted: 2017-06-23

## 2018-02-23 PROBLEM — F17.200 TOBACCO USE DISORDER: Status: ACTIVE | Noted: 2017-06-23

## 2018-02-23 PROBLEM — R09.89 BILATERAL CAROTID BRUITS: Status: ACTIVE | Noted: 2017-06-23

## 2018-02-23 RX ORDER — MULTIVITAMIN
1 CAPSULE ORAL DAILY
COMMUNITY

## 2018-02-23 RX ORDER — MULTIVIT WITH MINERALS/LUTEIN
TABLET ORAL
COMMUNITY

## 2018-02-23 RX ORDER — FAMOTIDINE 10 MG
10 TABLET ORAL 2 TIMES DAILY
COMMUNITY
Start: 2017-06-23 | End: 2018-07-12

## 2018-02-23 RX ORDER — TAMSULOSIN HYDROCHLORIDE 0.4 MG/1
0.4 CAPSULE ORAL DAILY
COMMUNITY
Start: 2017-06-23 | End: 2018-07-12

## 2018-07-12 ENCOUNTER — OFFICE VISIT (OUTPATIENT)
Dept: PEDIATRICS | Facility: OTHER | Age: 53
End: 2018-07-12
Attending: INTERNAL MEDICINE
Payer: COMMERCIAL

## 2018-07-12 VITALS
WEIGHT: 152 LBS | SYSTOLIC BLOOD PRESSURE: 126 MMHG | DIASTOLIC BLOOD PRESSURE: 80 MMHG | HEART RATE: 88 BPM | HEIGHT: 64 IN | BODY MASS INDEX: 25.95 KG/M2

## 2018-07-12 DIAGNOSIS — Z23 NEED FOR VACCINATION: ICD-10-CM

## 2018-07-12 DIAGNOSIS — F17.200 TOBACCO USE DISORDER: ICD-10-CM

## 2018-07-12 DIAGNOSIS — Z13.1 SCREENING FOR DIABETES MELLITUS: ICD-10-CM

## 2018-07-12 DIAGNOSIS — N40.0 BENIGN PROSTATIC HYPERPLASIA WITHOUT LOWER URINARY TRACT SYMPTOMS: ICD-10-CM

## 2018-07-12 DIAGNOSIS — E78.2 MIXED HYPERLIPIDEMIA: ICD-10-CM

## 2018-07-12 DIAGNOSIS — K21.9 GASTROESOPHAGEAL REFLUX DISEASE, ESOPHAGITIS PRESENCE NOT SPECIFIED: ICD-10-CM

## 2018-07-12 DIAGNOSIS — M54.16 LEFT LUMBAR RADICULOPATHY: ICD-10-CM

## 2018-07-12 DIAGNOSIS — Z00.00 WELL ADULT HEALTH CHECK: Primary | ICD-10-CM

## 2018-07-12 DIAGNOSIS — J30.2 SEASONAL ALLERGIC RHINITIS, UNSPECIFIED CHRONICITY, UNSPECIFIED TRIGGER: ICD-10-CM

## 2018-07-12 LAB
ANION GAP SERPL CALCULATED.3IONS-SCNC: 6 MMOL/L (ref 3–14)
BUN SERPL-MCNC: 9 MG/DL (ref 7–25)
CALCIUM SERPL-MCNC: 9.4 MG/DL (ref 8.6–10.3)
CHLORIDE SERPL-SCNC: 107 MMOL/L (ref 98–107)
CHOLEST SERPL-MCNC: 220 MG/DL
CO2 SERPL-SCNC: 27 MMOL/L (ref 21–31)
CREAT SERPL-MCNC: 0.88 MG/DL (ref 0.7–1.3)
GFR SERPL CREATININE-BSD FRML MDRD: >90 ML/MIN/1.7M2
GLUCOSE SERPL-MCNC: 109 MG/DL (ref 70–105)
HDLC SERPL-MCNC: 46 MG/DL (ref 23–92)
LDLC SERPL CALC-MCNC: 157 MG/DL
NONHDLC SERPL-MCNC: 174 MG/DL
POTASSIUM SERPL-SCNC: 4.1 MMOL/L (ref 3.5–5.1)
PSA SERPL-MCNC: 4.21 NG/ML
SODIUM SERPL-SCNC: 140 MMOL/L (ref 134–144)
TRIGL SERPL-MCNC: 86 MG/DL

## 2018-07-12 PROCEDURE — 99396 PREV VISIT EST AGE 40-64: CPT | Mod: 25 | Performed by: INTERNAL MEDICINE

## 2018-07-12 PROCEDURE — 90732 PPSV23 VACC 2 YRS+ SUBQ/IM: CPT | Performed by: INTERNAL MEDICINE

## 2018-07-12 PROCEDURE — G0009 ADMIN PNEUMOCOCCAL VACCINE: HCPCS | Performed by: INTERNAL MEDICINE

## 2018-07-12 PROCEDURE — 84153 ASSAY OF PSA TOTAL: CPT | Performed by: INTERNAL MEDICINE

## 2018-07-12 PROCEDURE — 80061 LIPID PANEL: CPT | Performed by: INTERNAL MEDICINE

## 2018-07-12 PROCEDURE — 80048 BASIC METABOLIC PNL TOTAL CA: CPT | Performed by: INTERNAL MEDICINE

## 2018-07-12 PROCEDURE — 36415 COLL VENOUS BLD VENIPUNCTURE: CPT | Performed by: INTERNAL MEDICINE

## 2018-07-12 RX ORDER — FAMOTIDINE 10 MG
10 TABLET ORAL 2 TIMES DAILY
Qty: 180 TABLET | Refills: 4 | Status: SHIPPED | OUTPATIENT
Start: 2018-07-12 | End: 2021-03-15

## 2018-07-12 RX ORDER — TAMSULOSIN HYDROCHLORIDE 0.4 MG/1
0.4 CAPSULE ORAL DAILY
Qty: 90 CAPSULE | Refills: 3 | Status: SHIPPED | OUTPATIENT
Start: 2018-07-12 | End: 2019-09-17

## 2018-07-12 RX ORDER — CALCIUM POLYCARBOPHIL 625 MG 625 MG/1
2 TABLET ORAL DAILY
COMMUNITY

## 2018-07-12 ASSESSMENT — ANXIETY QUESTIONNAIRES
IF YOU CHECKED OFF ANY PROBLEMS ON THIS QUESTIONNAIRE, HOW DIFFICULT HAVE THESE PROBLEMS MADE IT FOR YOU TO DO YOUR WORK, TAKE CARE OF THINGS AT HOME, OR GET ALONG WITH OTHER PEOPLE: NOT DIFFICULT AT ALL
GAD7 TOTAL SCORE: 0
3. WORRYING TOO MUCH ABOUT DIFFERENT THINGS: NOT AT ALL
7. FEELING AFRAID AS IF SOMETHING AWFUL MIGHT HAPPEN: NOT AT ALL
1. FEELING NERVOUS, ANXIOUS, OR ON EDGE: NOT AT ALL
2. NOT BEING ABLE TO STOP OR CONTROL WORRYING: NOT AT ALL
5. BEING SO RESTLESS THAT IT IS HARD TO SIT STILL: NOT AT ALL
6. BECOMING EASILY ANNOYED OR IRRITABLE: NOT AT ALL

## 2018-07-12 ASSESSMENT — PAIN SCALES - GENERAL: PAINLEVEL: NO PAIN (0)

## 2018-07-12 ASSESSMENT — PATIENT HEALTH QUESTIONNAIRE - PHQ9: 5. POOR APPETITE OR OVEREATING: NOT AT ALL

## 2018-07-12 NOTE — PATIENT INSTRUCTIONS
-- Call if back flares, will refer to PT   -- Consider fish oil: total of at least 3,000 mg of DHA plus EPA daily   -- Pneumonia 23 shot today   -- Call insurance re: new shingles shot   -- Use neti pot and loratadine as needed for allergies          Understanding Lumbar Radiculopathy    Lumbar radiculopathy is irritation or inflammation of a nerve root in the low back. It causes symptoms that spread out from the back down one or both legs. To understand this condition, it helps to understand the parts of the spine:    Vertebrae. These are bones that stack to form the spine. The lumbar spine contains the 5 bottom vertebrae.    Disks. These are soft pads of tissue between the vertebrae. They act as shock absorbers for the spine.    Spinal canal. This is a tunnel formed within the stacked vertebrae. In the lumbar spine, nerves run through this canal.    Nerves. These branch off and leave the spinal canal, traveling out to parts of the body. As they leave the spinal canal, nerves pass through openings between the vertebrae. The nerve root is the part of the nerve that is closest to the spinal canal.    Sciatic nerve. This is a large nerve formed from several nerve roots in the low back. This nerve extends down the back of the leg to the foot.  With lumbar radiculopathy, nerve roots in the low back become irritated. This leads to pain and symptoms. The sciatic nerve is commonly involved, so the condition is often called sciatica.  What causes lumbar radiculopathy?  Aging, injury, poor posture, extra body weight, and other issues can lead to problems in the low back. These problems may then irritate nerve roots. They include:    Damage to a disk in the lumbar spine. The damaged disk may then press on nearby nerve roots.    Degeneration from wear and tear, and aging. This can lead to narrowing (stenosis) of the openings between the vertebrae. The narrowed openings press on nerve roots as they leave the spinal canal.     Unstable spine. This is when a vertebra slips forward. It can then press on a nerve root.  Other, less common things can put pressure on nerves in the low back. These include diabetes, infection, or a tumor.  Symptoms of lumbar radiculopathy  These include:    Pain in the low back    Pain, numbness, tingling, or weakness that travels into the buttocks, hip, groin, or leg    Muscle spasms  Treatment for lumbar radiculopathy  In most cases, your healthcare provider will first try treatments that help relieve symptoms. These may include:    Prescription and over-the-counter pain medicines. These help relieve pain, swelling, and irritation.    Limits on positions and activities that increase pain. But lying in bed or avoiding all movement is only recommended for a short period of time.    Physical therapy, including exercises and stretches. This helps decrease pain and increase movement and function.    Steroid shots into the lower back. This may help relieve symptoms for a time.    Weight-loss program. If you are overweight, losing extra pounds may help relieve symptoms.  In some cases, you may need surgery to fix the underlying problem. This depends on the cause, the symptoms, and how long the pain has lasted.  Possible complications  Over time, an irritated and inflamed nerve may become damaged. This may lead to long-lasting (permanent) numbness or weakness in your legs and feet. If symptoms change suddenly or get worse, be sure to let your healthcare provider know.  When to call your healthcare provider  Call your healthcare provider right away if you have any of these:    New pain or pain that gets worse    New or increasing weakness, tingling, or numbness in your leg or foot    Problems controlling your bladder or bowel   Date Last Reviewed: 3/10/2016    7005-2443 [employer]. [address] [place] [address], [place] [address]. All rights reserved. This information is not intended as a substitute for  professional medical care. Always follow your healthcare professional's instructions.

## 2018-07-12 NOTE — MR AVS SNAPSHOT
After Visit Summary   7/12/2018    Marvin Chavarria    MRN: [id number]           Patient Information     Date Of Birth          1965        Visit Information        Provider Department      7/12/2018 9:00 AM Lacho Lizama MD [facility]        Today's Diagnoses     Well adult health check    -  1    Left lumbar radiculopathy        Benign prostatic hyperplasia without lower urinary tract symptoms        Gastroesophageal reflux disease, esophagitis presence not specified        Need for vaccination        Screening for diabetes mellitus        Mixed hyperlipidemia        Tobacco use disorder        Seasonal allergic rhinitis, unspecified chronicity, unspecified trigger          Care Instructions     -- Call if back flares, will refer to PT   -- Consider fish oil: total of at least 3,000 mg of DHA plus EPA daily   -- Pneumonia 23 shot today   -- Call insurance re: new shingles shot   -- Use neti pot and loratadine as needed for allergies          Understanding Lumbar Radiculopathy    Lumbar radiculopathy is irritation or inflammation of a nerve root in the low back. It causes symptoms that spread out from the back down one or both legs. To understand this condition, it helps to understand the parts of the spine:    Vertebrae. These are bones that stack to form the spine. The lumbar spine contains the 5 bottom vertebrae.    Disks. These are soft pads of tissue between the vertebrae. They act as shock absorbers for the spine.    Spinal canal. This is a tunnel formed within the stacked vertebrae. In the lumbar spine, nerves run through this canal.    Nerves. These branch off and leave the spinal canal, traveling out to parts of the body. As they leave the spinal canal, nerves pass through openings between the vertebrae. The nerve root is the part of the nerve that is closest to the spinal canal.    Sciatic nerve. This is a large nerve formed from several nerve roots in the  low back. This nerve extends down the back of the leg to the foot.  With lumbar radiculopathy, nerve roots in the low back become irritated. This leads to pain and symptoms. The sciatic nerve is commonly involved, so the condition is often called sciatica.  What causes lumbar radiculopathy?  Aging, injury, poor posture, extra body weight, and other issues can lead to problems in the low back. These problems may then irritate nerve roots. They include:    Damage to a disk in the lumbar spine. The damaged disk may then press on nearby nerve roots.    Degeneration from wear and tear, and aging. This can lead to narrowing (stenosis) of the openings between the vertebrae. The narrowed openings press on nerve roots as they leave the spinal canal.    Unstable spine. This is when a vertebra slips forward. It can then press on a nerve root.  Other, less common things can put pressure on nerves in the low back. These include diabetes, infection, or a tumor.  Symptoms of lumbar radiculopathy  These include:    Pain in the low back    Pain, numbness, tingling, or weakness that travels into the buttocks, hip, groin, or leg    Muscle spasms  Treatment for lumbar radiculopathy  In most cases, your healthcare provider will first try treatments that help relieve symptoms. These may include:    Prescription and over-the-counter pain medicines. These help relieve pain, swelling, and irritation.    Limits on positions and activities that increase pain. But lying in bed or avoiding all movement is only recommended for a short period of time.    Physical therapy, including exercises and stretches. This helps decrease pain and increase movement and function.    Steroid shots into the lower back. This may help relieve symptoms for a time.    Weight-loss program. If you are overweight, losing extra pounds may help relieve symptoms.  In some cases, you may need surgery to fix the underlying problem. This depends on the cause, the symptoms,  and how long the pain has lasted.  Possible complications  Over time, an irritated and inflamed nerve may become damaged. This may lead to long-lasting (permanent) numbness or weakness in your legs and feet. If symptoms change suddenly or get worse, be sure to let your healthcare provider know.  When to call your healthcare provider  Call your healthcare provider right away if you have any of these:    New pain or pain that gets worse    New or increasing weakness, tingling, or numbness in your leg or foot    Problems controlling your bladder or bowel   Date Last Reviewed: 3/10/2016    [phone] [employer]. [address] [place] [address]. All rights reserved. This information is not intended as a substitute for professional medical care. Always follow your healthcare professional's instructions.                Follow-ups after your visit        Follow-up notes from your care team     Return in about 1 year (around 7/12/2019) for annual physical.      Who to contact     If you have questions or need follow up information about today's clinic visit or your schedule please contact [facility] directly at [phone].  Normal or non-critical lab and imaging results will be communicated to you by [employer], letter or phone within 4 business days after the clinic has received the results. If you do not hear from us within 7 days, please contact the clinic through [employer] or phone. If you have a critical or abnormal lab result, we will notify you by phone as soon as possible.  Submit refill requests through [employer] or call your pharmacy and they will forward the refill request to us. Please allow 3 business days for your refill to be completed.          Additional Information About Your Visit        [employer] Information     [employer] gives you secure access to your electronic health record. If you see a primary care provider, you can also send messages to your care team and make  "appointments. If you have questions, please call your primary care clinic.  If you do not have a primary care provider, please call [phone] and they will assist you.        Care EveryWhere ID     This is your Care EveryWhere ID. This could be used by other organizations to access your [facility] medical records  [id number]        Your Vitals Were     Pulse Height BMI (Body Mass Index)             88 5' 4\" (1.626 m) 26.09 kg/m2          Blood Pressure from Last 3 Encounters:   07/12/18 126/80   02/01/18 140/80   06/23/17 130/82    Weight from Last 3 Encounters:   07/12/18 152 lb (68.9 kg)   02/01/18 154 lb 6.4 oz (70 kg)   06/23/17 152 lb 12.8 oz (69.3 kg)              We Performed the Following     Basic metabolic panel     Lipid Profile     Pneumococcal vaccine 23 valent PPSV23  (Pneumovax) [92546]     Prostate Specific Antigen GH          Where to get your medicines      These medications were sent to [facility], - [place], [place] - [address]  [address], [place] [address]     Phone:  [phone]     famotidine 10 MG tablet    tamsulosin 0.4 MG capsule          Primary Care Provider Office Phone # Fax #    Lacho Jeovanny Lizama [phone]       [address] [facility] [address]        Equal Access to Services     [facility] AH: Hadii vivek mtz hadasho Sosukhjinderali, waaxda luqadaha, qaybta kaalmada adeegyada, nita zepeda . [facility] [phone].    ATENCIÓN: Si habla español, tiene a morrissey disposición servicios gratuitos de asistencia lingüística. Llame al [phone].    We comply with applicable federal civil rights laws and [place] laws. We do not discriminate on the basis of race, color, national origin, age, disability, sex, sexual orientation, or gender identity.            Thank you!     Thank you for choosing [facility]  for your care. Our goal is always to provide you with excellent " care. Hearing back from our patients is one way we can continue to improve our services. Please take a few minutes to complete the written survey that you may receive in the mail after your visit with us. Thank you!             Your Updated Medication List - Protect others around you: Learn how to safely use, store and throw away your medicines at www.disposemymeds.org.          This list is accurate as of 7/12/18  9:36 AM.  Always use your most recent med list.                   Brand Name Dispense Instructions for use Diagnosis    ascorbic acid 1000 MG Tabs    vitamin C          CVS ZINC 50 MG Tabs           EQ FIBER THERAPY 625 MG tablet   Generic drug:  calcium polycarbophil      Take 2 tablets by mouth daily        famotidine 10 MG tablet    PEPCID    180 tablet    Take 1 tablet (10 mg) by mouth 2 times daily    Gastroesophageal reflux disease, esophagitis presence not specified       MULTIvitamin  S Caps      Take 1 capsule by mouth daily        tamsulosin 0.4 MG capsule    FLOMAX    90 capsule    Take 1 capsule (0.4 mg) by mouth daily After a meal    Benign prostatic hyperplasia without lower urinary tract symptoms

## 2018-07-12 NOTE — NURSING NOTE
Patient presents to clinic for PX and medication refills.  Evette Fritz LPN ....................  7/12/2018   9:11 AM

## 2018-07-12 NOTE — PROGRESS NOTES
Subjective  Marvin Chavarria is a 52 year old male who presents for annual physical.  He has a history of gastroesophageal reflux disease which has been stable with famotidine.  Typically just takes it once a day, sometimes twice a day if it has flared up.  History of benign prostate enlargement and continues on Flomax.  He thinks it has been helping a lot.  It has enabled his stream to be wider.  He still has some symptoms at nighttime at work because he drinks a lot of liquids.  No chest pains with exertion.  Occasionally he gets a pain in his left heel.  He feels like it starts in the left upper outer buttock radiating around by the knee.  Typically notices it while standing at work.  No specific injury or trauma.  Typically lasts for several days and then fades away.  He does get allergy symptoms from time to time.  He thinks they are happening more as he gets older.  Associated with sneezing and itchy eyes, rhinorrhea.  There are allergies in his family.  Colonoscopy is up-to-date, due in 2020.    Problem List/PMH: reviewed in EMR, and made relevant updates today.  Medications: reviewed in EMR, and made relevant updates today.  Allergies: reviewed in EMR, and made relevant updates today.    Social Hx:  Social History   Substance Use Topics     Smoking status: Current Every Day Smoker     Packs/day: 1.00     Types: Cigarettes     Smokeless tobacco: Never Used     Alcohol use No     Social History     Social History Narrative    , 2 children  works nights at the [employer]  Wife Yuko Chavarria     I reviewed social history and made relevant updates today.    Family Hx:   Family History   Problem Relation Age of Onset     Other - See Comments Father      BPH     HEART DISEASE Father 85     Heart Disease,AMI     Dementia Mother      Dementia     Arthritis Mother      Arthritis     Other - See Comments Brother      enlarged prostate     Other - See Comments Brother      x3 with BPH     Diabetes Brother      Diabetes  "    Prostate Cancer No family hx of      Cancer-prostate     Colon Cancer No family hx of      Cancer-colon       Objective  Vitals: reviewed in EMR.  /80 (BP Location: Right arm, Patient Position: Sitting, Cuff Size: Adult Large)  Pulse 88  Ht 5' 4\" (1.626 m)  Wt 152 lb (68.9 kg)  BMI 26.09 kg/m2    Gen: Pleasant male, NAD.  HEENT: MMM, no OP erythema.  Tympanic membranes with clear fluid, slight erythema, no pus.  Neck: Supple, no JVD, no bruits.  CV: RRR no m/r/g.   Pulm: CTAB no w/r/r  GI: Soft, NT, ND. No HSM. No masses. Bowel sounds present.  Neuro: Grossly intact  Msk: No lower extremity edema.  Skin: No concerning lesions.  Psychiatric: Normal affect and insight. Does not appear anxious or depressed.    Labs:  Lab Results   Component Value Date    HGB 14.6 06/23/2017    HCT 43.9 06/23/2017     06/23/2017    TRIG 180 (H) 06/23/2017    HDL 45 06/23/2017     06/23/2017    BUN 18 06/23/2017    CO2 24 06/23/2017         Assessment    ICD-10-CM    1. Well adult health check Z00.00    2. Left lumbar radiculopathy M54.16    3. Benign prostatic hyperplasia without lower urinary tract symptoms N40.0 tamsulosin (FLOMAX) 0.4 MG capsule     Prostate Specific Antigen GH   4. Gastroesophageal reflux disease, esophagitis presence not specified K21.9 famotidine (PEPCID) 10 MG tablet   5. Need for vaccination Z23 Pneumococcal vaccine 23 valent PPSV23  (Pneumovax) [80202]   6. Screening for diabetes mellitus Z13.1 Basic metabolic panel   7. Mixed hyperlipidemia E78.2 Lipid Profile   8. Tobacco use disorder F17.200    9. Seasonal allergic rhinitis, unspecified chronicity, unspecified trigger J30.2      Orders Placed This Encounter   Procedures     Pneumococcal vaccine 23 valent PPSV23  (Pneumovax) [29316]     Basic metabolic panel     Lipid Profile     Prostate Specific Antigen GH     He qualifies for a pneumococcal vaccine based on tobacco use disorder.    Plan   -- Expected clinical course discussed   " -- Medications and their side effects discussed  Patient Instructions      -- Call if back flares, will refer to PT   -- Consider fish oil: total of at least 3,000 mg of DHA plus EPA daily   -- Pneumonia 23 shot today   -- Call insurance re: new shingles shot   -- Use neti pot and loratadine as needed for allergies          Understanding Lumbar Radiculopathy    Lumbar radiculopathy is irritation or inflammation of a nerve root in the low back. It causes symptoms that spread out from the back down one or both legs. To understand this condition, it helps to understand the parts of the spine:    Vertebrae. These are bones that stack to form the spine. The lumbar spine contains the 5 bottom vertebrae.    Disks. These are soft pads of tissue between the vertebrae. They act as shock absorbers for the spine.    Spinal canal. This is a tunnel formed within the stacked vertebrae. In the lumbar spine, nerves run through this canal.    Nerves. These branch off and leave the spinal canal, traveling out to parts of the body. As they leave the spinal canal, nerves pass through openings between the vertebrae. The nerve root is the part of the nerve that is closest to the spinal canal.    Sciatic nerve. This is a large nerve formed from several nerve roots in the low back. This nerve extends down the back of the leg to the foot.  With lumbar radiculopathy, nerve roots in the low back become irritated. This leads to pain and symptoms. The sciatic nerve is commonly involved, so the condition is often called sciatica.  What causes lumbar radiculopathy?  Aging, injury, poor posture, extra body weight, and other issues can lead to problems in the low back. These problems may then irritate nerve roots. They include:    Damage to a disk in the lumbar spine. The damaged disk may then press on nearby nerve roots.    Degeneration from wear and tear, and aging. This can lead to narrowing (stenosis) of the openings between the vertebrae. The  narrowed openings press on nerve roots as they leave the spinal canal.    Unstable spine. This is when a vertebra slips forward. It can then press on a nerve root.  Other, less common things can put pressure on nerves in the low back. These include diabetes, infection, or a tumor.  Symptoms of lumbar radiculopathy  These include:    Pain in the low back    Pain, numbness, tingling, or weakness that travels into the buttocks, hip, groin, or leg    Muscle spasms  Treatment for lumbar radiculopathy  In most cases, your healthcare provider will first try treatments that help relieve symptoms. These may include:    Prescription and over-the-counter pain medicines. These help relieve pain, swelling, and irritation.    Limits on positions and activities that increase pain. But lying in bed or avoiding all movement is only recommended for a short period of time.    Physical therapy, including exercises and stretches. This helps decrease pain and increase movement and function.    Steroid shots into the lower back. This may help relieve symptoms for a time.    Weight-loss program. If you are overweight, losing extra pounds may help relieve symptoms.  In some cases, you may need surgery to fix the underlying problem. This depends on the cause, the symptoms, and how long the pain has lasted.  Possible complications  Over time, an irritated and inflamed nerve may become damaged. This may lead to long-lasting (permanent) numbness or weakness in your legs and feet. If symptoms change suddenly or get worse, be sure to let your healthcare provider know.  When to call your healthcare provider  Call your healthcare provider right away if you have any of these:    New pain or pain that gets worse    New or increasing weakness, tingling, or numbness in your leg or foot    Problems controlling your bladder or bowel   Date Last Reviewed: 3/10/2016    [id number] [employer]. [address] [place] [address], [place] [address]. All  rights reserved. This information is not intended as a substitute for professional medical care. Always follow your healthcare professional's instructions.            Return in about 1 year (around 7/12/2019) for annual physical.    Signed, Lacho Lizama MD  Internal Medicine & Pediatrics

## 2018-07-13 ASSESSMENT — PATIENT HEALTH QUESTIONNAIRE - PHQ9: SUM OF ALL RESPONSES TO PHQ QUESTIONS 1-9: 0

## 2018-07-13 ASSESSMENT — ANXIETY QUESTIONNAIRES: GAD7 TOTAL SCORE: 0

## 2018-07-23 NOTE — PROGRESS NOTES
Patient Information     Patient Name  Marvin Chavarria MRN  [id number] Sex  Male   1965      Letter by Rachel Cardona MD at      Author:  Rachel Cardona MD Service:  (none) Author Type:  (none)    Filed:   Encounter Date:  2017 Status:  (Other)           Marvin Chavarria  [address] [facility] [address]          2017    Dear Mr. Chavarria:    This letter is in regards to your colonoscopy that was done by Rachel Cardona MD on 17.   The polyp(s) removed from your colon were TUBULAR ADENOMAS.  Adenomatous polyps are  pre-cancerous, yet BENIGN.  The polyp was removed. You have an increased risk for developing other polyps in the future.  For that reason, Dr. Rachel Cardona MD recommends a repeat colonoscopy in 3 years due to the number and size unless you have problems.      Dr. Rachel Cardona MD also recommends a high fiber diet. A fiber supplement such as Metamucil, FiberCon, or Citrucel is recommended. Generic forms of these supplements are fine. These are available over the counter.       If you have any questions regarding this report, please call the Surgery department at [phone]  A copy of this report will be placed in your electronic medical record for your primary care provider's review.    Sincerely,    General Surgery      Reviewed and electronically signed by provider.

## 2019-09-17 DIAGNOSIS — N40.0 BENIGN PROSTATIC HYPERPLASIA WITHOUT LOWER URINARY TRACT SYMPTOMS: ICD-10-CM

## 2019-09-17 NOTE — LETTER
September 19, 2019      Marvin Chavarria  [address] [facility] [address]          Dear Marvin,     A refill request was received from your pharmacy for Flomax.    Additional refills require an office visit with Dr. Lizama for annual review.    Please call [phone] to schedule appointment.        Sincerely,        Refill Nurse

## 2019-09-19 RX ORDER — TAMSULOSIN HYDROCHLORIDE 0.4 MG/1
0.4 CAPSULE ORAL DAILY
Qty: 90 CAPSULE | Refills: 0 | Status: SHIPPED | OUTPATIENT
Start: 2019-09-19 | End: 2019-12-26

## 2019-09-19 NOTE — TELEPHONE ENCOUNTER
Prescription approved per [facility] Refill Protocol.  LOV: 7/12/18    Patient due for annual review  Letter sent and My Chart sent  Ailyn refill given  Guerita Fleming RN on 9/19/2019 at 4:04 PM

## 2020-01-06 ENCOUNTER — OFFICE VISIT (OUTPATIENT)
Dept: PEDIATRICS | Facility: OTHER | Age: 55
End: 2020-01-06
Attending: INTERNAL MEDICINE
Payer: COMMERCIAL

## 2020-01-06 VITALS
BODY MASS INDEX: 24.75 KG/M2 | HEART RATE: 84 BPM | RESPIRATION RATE: 16 BRPM | WEIGHT: 145 LBS | HEIGHT: 64 IN | DIASTOLIC BLOOD PRESSURE: 70 MMHG | SYSTOLIC BLOOD PRESSURE: 112 MMHG | TEMPERATURE: 97.8 F

## 2020-01-06 DIAGNOSIS — R73.01 IMPAIRED FASTING GLUCOSE: ICD-10-CM

## 2020-01-06 DIAGNOSIS — N40.0 BENIGN PROSTATIC HYPERPLASIA WITHOUT LOWER URINARY TRACT SYMPTOMS: ICD-10-CM

## 2020-01-06 DIAGNOSIS — R10.31: ICD-10-CM

## 2020-01-06 DIAGNOSIS — Z00.00 ANNUAL PHYSICAL EXAM: Primary | ICD-10-CM

## 2020-01-06 DIAGNOSIS — Z23 NEED FOR VACCINATION: ICD-10-CM

## 2020-01-06 DIAGNOSIS — E78.2 MIXED HYPERLIPIDEMIA: ICD-10-CM

## 2020-01-06 DIAGNOSIS — D12.6 TUBULAR ADENOMA OF COLON: ICD-10-CM

## 2020-01-06 DIAGNOSIS — R73.03 PRE-DIABETES: ICD-10-CM

## 2020-01-06 DIAGNOSIS — R97.20 ELEVATED PROSTATE SPECIFIC ANTIGEN (PSA): ICD-10-CM

## 2020-01-06 DIAGNOSIS — F17.200 TOBACCO USE DISORDER: ICD-10-CM

## 2020-01-06 LAB
ANION GAP SERPL CALCULATED.3IONS-SCNC: 6 MMOL/L (ref 3–14)
BUN SERPL-MCNC: 10 MG/DL (ref 7–25)
CALCIUM SERPL-MCNC: 9 MG/DL (ref 8.6–10.3)
CHLORIDE SERPL-SCNC: 104 MMOL/L (ref 98–107)
CHOLEST SERPL-MCNC: 185 MG/DL
CO2 SERPL-SCNC: 29 MMOL/L (ref 21–31)
CREAT SERPL-MCNC: 0.94 MG/DL (ref 0.7–1.3)
GFR SERPL CREATININE-BSD FRML MDRD: 84 ML/MIN/{1.73_M2}
GLUCOSE SERPL-MCNC: 111 MG/DL (ref 70–105)
HBA1C MFR BLD: 6 % (ref 4–6)
HDLC SERPL-MCNC: 53 MG/DL (ref 23–92)
LDLC SERPL CALC-MCNC: 117 MG/DL
NONHDLC SERPL-MCNC: 132 MG/DL
POTASSIUM SERPL-SCNC: 3.9 MMOL/L (ref 3.5–5.1)
PSA SERPL-MCNC: 5.44 NG/ML
SODIUM SERPL-SCNC: 139 MMOL/L (ref 134–144)
TRIGL SERPL-MCNC: 75 MG/DL

## 2020-01-06 PROCEDURE — 99396 PREV VISIT EST AGE 40-64: CPT | Mod: 25 | Performed by: INTERNAL MEDICINE

## 2020-01-06 PROCEDURE — 84153 ASSAY OF PSA TOTAL: CPT | Mod: ZL | Performed by: INTERNAL MEDICINE

## 2020-01-06 PROCEDURE — 80048 BASIC METABOLIC PNL TOTAL CA: CPT | Mod: ZL | Performed by: INTERNAL MEDICINE

## 2020-01-06 PROCEDURE — 80061 LIPID PANEL: CPT | Mod: ZL | Performed by: INTERNAL MEDICINE

## 2020-01-06 PROCEDURE — 83036 HEMOGLOBIN GLYCOSYLATED A1C: CPT | Mod: ZL | Performed by: INTERNAL MEDICINE

## 2020-01-06 PROCEDURE — 90686 IIV4 VACC NO PRSV 0.5 ML IM: CPT | Performed by: INTERNAL MEDICINE

## 2020-01-06 PROCEDURE — 36415 COLL VENOUS BLD VENIPUNCTURE: CPT | Mod: ZL | Performed by: INTERNAL MEDICINE

## 2020-01-06 PROCEDURE — 90471 IMMUNIZATION ADMIN: CPT | Performed by: INTERNAL MEDICINE

## 2020-01-06 RX ORDER — ATORVASTATIN CALCIUM 10 MG/1
10 TABLET, FILM COATED ORAL DAILY
Qty: 90 TABLET | Refills: 3 | Status: SHIPPED | OUTPATIENT
Start: 2020-01-06 | End: 2020-01-06

## 2020-01-06 RX ORDER — TAMSULOSIN HYDROCHLORIDE 0.4 MG/1
0.4 CAPSULE ORAL DAILY
Qty: 30 CAPSULE | Refills: 0 | Status: SHIPPED | OUTPATIENT
Start: 2020-01-06 | End: 2020-02-26

## 2020-01-06 RX ORDER — AMOXICILLIN 500 MG
1200 CAPSULE ORAL DAILY
COMMUNITY

## 2020-01-06 ASSESSMENT — PAIN SCALES - GENERAL: PAINLEVEL: NO PAIN (0)

## 2020-01-06 ASSESSMENT — MIFFLIN-ST. JEOR: SCORE: 1408.72

## 2020-01-06 NOTE — H&P (VIEW-ONLY)
Subjective  Marvin Chavarria is a 54 year old male who presents for annual physical.  He has a history of benign prostate enlargement and continues on tamsulosin.  He notices if he gets sexually excited he develops pain in the right lower quadrant/right groin area.  He is noticed no bulging or redness.  Has not noticed it any other time.  He has minimal physical exertion.  He has a job where he ambulates at the [employer] without any major exertion.  No regular exercise routine.  He continues to smoke but is not yet ready to quit although he is considering.    Problem List/PMH: reviewed in EMR, and made relevant updates today.  Medications: reviewed in EMR, and made relevant updates today.  Allergies: reviewed in EMR, and made relevant updates today.    Social Hx:  Social History     Tobacco Use     Smoking status: Current Every Day Smoker     Packs/day: 1.00     Years: 30.00     Pack years: 30.00     Types: Cigarettes     Smokeless tobacco: Never Used     Tobacco comment: quit 8 years once   Substance Use Topics     Alcohol use: No     Alcohol/week: 0.0 standard drinks     Drug use: Not Currently     Comment: Drug use: No     Social History     Social History Narrative    , 2 children  works nights at the [employer]  Wife Yuko Chavarria     I reviewed social history and made relevant updates today.    Family Hx:   Family History   Problem Relation Age of Onset     Other - See Comments Father         BPH     Heart Disease Father 85        Heart Disease,AMI     Dementia Mother         Dementia     Arthritis Mother         Arthritis     Other - See Comments Brother         enlarged prostate     Other - See Comments Brother         x3 with BPH     Diabetes Brother         Diabetes     Prostate Cancer No family hx of         Cancer-prostate     Colon Cancer No family hx of         Cancer-colon       Objective  Vitals: reviewed in EMR.  /70 (BP Location: Right arm, Patient Position: Sitting, Cuff Size: Adult Regular)    "Pulse 84   Temp 97.8  F (36.6  C) (Tympanic)   Resp 16   Ht 1.626 m (5' 4\")   Wt 65.8 kg (145 lb)   BMI 24.89 kg/m      Gen: Pleasant male, NAD.  HEENT: MMM, no OP erythema.   Neck: Supple, no JVD, no bruits.  CV: RRR no m/r/g.   Pulm: CTAB no w/r/r  GI: Soft, NT, ND. No HSM. No masses. Bowel sounds present.  : Normal circumcised male anatomy.  No inguinal hernias.  Neuro: Grossly intact  Msk: No lower extremity edema.  Skin: No concerning lesions.  Psychiatric: Normal affect and insight. Does not appear anxious or depressed.    Labs:  Lab Results   Component Value Date    HGB 14.6 06/23/2017    HCT 43.9 06/23/2017     06/23/2017    CHOL 185 01/06/2020    TRIG 75 01/06/2020    HDL 53 01/06/2020     01/06/2020    BUN 10 01/06/2020    CO2 29 01/06/2020       Glucose   Date Value Ref Range Status   01/06/2020 111 (H) 70 - 105 mg/dL Final   07/12/2018 109 (H) 70 - 105 mg/dL Final     Hemoglobin A1C   Date Value Ref Range Status   01/06/2020 6.0 4.0 - 6.0 % Final     Creatinine   Date Value Ref Range Status   01/06/2020 0.94 0.70 - 1.30 mg/dL Final   07/12/2018 0.88 0.70 - 1.30 mg/dL Final     LDL Cholesterol Calculated   Date Value Ref Range Status   01/06/2020 117 (H) <100 mg/dL Final     Comment:     Above desirable:  100-129 mg/dl  Borderline High:  130-159 mg/dL  High:             160-189 mg/dL  Very high:       >189 mg/dl               Assessment    ICD-10-CM    1. Annual physical exam Z00.00    2. Benign prostatic hyperplasia without lower urinary tract symptoms N40.0 tamsulosin (FLOMAX) 0.4 MG capsule     Prostate Specific Antigen GH     Prostate Specific Antigen      UROLOGY ADULT REFERRAL   3. Pain of right inguinal ring R10.31 Prostate Specific Antigen GH     Prostate Specific Antigen    4. Mixed hyperlipidemia E78.2 aspirin (ASA) 81 MG EC tablet     Lipid Profile     Lipid Profile     DISCONTINUED: atorvastatin (LIPITOR) 10 MG tablet   5. Tobacco use disorder F17.200    6. Need for " vaccination Z23 GH-IMM- FLU VAC PRESRV FREE QUAD SPLIT VIR > 6 MONTHS IM   7. Tubular adenoma of colon D12.6 GASTROENTEROLOGY ADULT REF PROCEDURE ONLY   8. Impaired fasting glucose R73.01 Basic metabolic panel     Hemoglobin A1c     Hemoglobin A1c     Basic metabolic panel   9. Elevated prostate specific antigen (PSA) R97.20 UROLOGY ADULT REFERRAL   10. Pre-diabetes R73.03      Orders Placed This Encounter   Procedures     GH-IMM- FLU VAC PRESRV FREE QUAD SPLIT VIR > 6 MONTHS IM     Prostate Specific Antigen GH     Basic metabolic panel     Hemoglobin A1c     Lipid Profile     GASTROENTEROLOGY ADULT REF PROCEDURE ONLY     UROLOGY ADULT REFERRAL       Initially I recommended that he start Lipitor however after this encounter had completed his cholesterol came back significantly lower with a reduction in his 10-year ASCVD score to less than 7.5%.  I notified the patient that we could hold off on starting a statin at this time.    PSA level has risen and with a new symptoms associated with sexual intercourse I recommend he see Dr. Costa of urology.    Plan   -- Expected clinical course discussed   -- Medications and their side effects discussed  Patient Instructions    -- Start Lipitor   -- You should quit smoking   -- Flu shot     -- Get the new shingles vaccine series from a nurse-only visit, pharmacy or [facility] ([facility] RN).      The 10-year ASCVD risk score ([place] DC Jr., et al., 2013) is: 9.4%    Values used to calculate the score:      Age: 54 years      Sex: Male      Is Non- : No      Diabetic: No      Tobacco smoker: Yes      Systolic Blood Pressure: 112 mmHg      Is BP treated: No      HDL Cholesterol: 46 mg/dL      Total Cholesterol: 220 mg/dL        No follow-ups on file.    Signed, Lacho Lizama MD, FAAP, FACP  Internal Medicine & Pediatrics

## 2020-01-06 NOTE — PATIENT INSTRUCTIONS
-- Start Lipitor   -- You should quit smoking   -- Flu shot     -- Get the new shingles vaccine series from a nurse-only visit, pharmacy or [facility] ([facility] RN).      The 10-year ASCVD risk score (Kassandra SLOAN Jr., et al., 2013) is: 9.4%    Values used to calculate the score:      Age: 54 years      Sex: Male      Is Non- : No      Diabetic: No      Tobacco smoker: Yes      Systolic Blood Pressure: 112 mmHg      Is BP treated: No      HDL Cholesterol: 46 mg/dL      Total Cholesterol: 220 mg/dL

## 2020-01-06 NOTE — PROGRESS NOTES
Subjective  Marvin Chavarria is a 54 year old male who presents for annual physical.  He has a history of benign prostate enlargement and continues on tamsulosin.  He notices if he gets sexually excited he develops pain in the right lower quadrant/right groin area.  He is noticed no bulging or redness.  Has not noticed it any other time.  He has minimal physical exertion.  He has a job where he ambulates at the [employer] without any major exertion.  No regular exercise routine.  He continues to smoke but is not yet ready to quit although he is considering.    Problem List/PMH: reviewed in EMR, and made relevant updates today.  Medications: reviewed in EMR, and made relevant updates today.  Allergies: reviewed in EMR, and made relevant updates today.    Social Hx:  Social History     Tobacco Use     Smoking status: Current Every Day Smoker     Packs/day: 1.00     Years: 30.00     Pack years: 30.00     Types: Cigarettes     Smokeless tobacco: Never Used     Tobacco comment: quit 8 years once   Substance Use Topics     Alcohol use: No     Alcohol/week: 0.0 standard drinks     Drug use: Not Currently     Comment: Drug use: No     Social History     Social History Narrative    , 2 children  works nights at the [employer]  Wife Yuko Chavarria     I reviewed social history and made relevant updates today.    Family Hx:   Family History   Problem Relation Age of Onset     Other - See Comments Father         BPH     Heart Disease Father 85        Heart Disease,AMI     Dementia Mother         Dementia     Arthritis Mother         Arthritis     Other - See Comments Brother         enlarged prostate     Other - See Comments Brother         x3 with BPH     Diabetes Brother         Diabetes     Prostate Cancer No family hx of         Cancer-prostate     Colon Cancer No family hx of         Cancer-colon       Objective  Vitals: reviewed in EMR.  /70 (BP Location: Right arm, Patient Position: Sitting, Cuff Size: Adult Regular)    "Pulse 84   Temp 97.8  F (36.6  C) (Tympanic)   Resp 16   Ht 1.626 m (5' 4\")   Wt 65.8 kg (145 lb)   BMI 24.89 kg/m      Gen: Pleasant male, NAD.  HEENT: MMM, no OP erythema.   Neck: Supple, no JVD, no bruits.  CV: RRR no m/r/g.   Pulm: CTAB no w/r/r  GI: Soft, NT, ND. No HSM. No masses. Bowel sounds present.  : Normal circumcised male anatomy.  No inguinal hernias.  Neuro: Grossly intact  Msk: No lower extremity edema.  Skin: No concerning lesions.  Psychiatric: Normal affect and insight. Does not appear anxious or depressed.    Labs:  Lab Results   Component Value Date    HGB 14.6 06/23/2017    HCT 43.9 06/23/2017     06/23/2017    CHOL 185 01/06/2020    TRIG 75 01/06/2020    HDL 53 01/06/2020     01/06/2020    BUN 10 01/06/2020    CO2 29 01/06/2020       Glucose   Date Value Ref Range Status   01/06/2020 111 (H) 70 - 105 mg/dL Final   07/12/2018 109 (H) 70 - 105 mg/dL Final     Hemoglobin A1C   Date Value Ref Range Status   01/06/2020 6.0 4.0 - 6.0 % Final     Creatinine   Date Value Ref Range Status   01/06/2020 0.94 0.70 - 1.30 mg/dL Final   07/12/2018 0.88 0.70 - 1.30 mg/dL Final     LDL Cholesterol Calculated   Date Value Ref Range Status   01/06/2020 117 (H) <100 mg/dL Final     Comment:     Above desirable:  100-129 mg/dl  Borderline High:  130-159 mg/dL  High:             160-189 mg/dL  Very high:       >189 mg/dl               Assessment    ICD-10-CM    1. Annual physical exam Z00.00    2. Benign prostatic hyperplasia without lower urinary tract symptoms N40.0 tamsulosin (FLOMAX) 0.4 MG capsule     Prostate Specific Antigen GH     Prostate Specific Antigen      UROLOGY ADULT REFERRAL   3. Pain of right inguinal ring R10.31 Prostate Specific Antigen GH     Prostate Specific Antigen    4. Mixed hyperlipidemia E78.2 aspirin (ASA) 81 MG EC tablet     Lipid Profile     Lipid Profile     DISCONTINUED: atorvastatin (LIPITOR) 10 MG tablet   5. Tobacco use disorder F17.200    6. Need for " vaccination Z23 GH-IMM- FLU VAC PRESRV FREE QUAD SPLIT VIR > 6 MONTHS IM   7. Tubular adenoma of colon D12.6 GASTROENTEROLOGY ADULT REF PROCEDURE ONLY   8. Impaired fasting glucose R73.01 Basic metabolic panel     Hemoglobin A1c     Hemoglobin A1c     Basic metabolic panel   9. Elevated prostate specific antigen (PSA) R97.20 UROLOGY ADULT REFERRAL   10. Pre-diabetes R73.03      Orders Placed This Encounter   Procedures     GH-IMM- FLU VAC PRESRV FREE QUAD SPLIT VIR > 6 MONTHS IM     Prostate Specific Antigen GH     Basic metabolic panel     Hemoglobin A1c     Lipid Profile     GASTROENTEROLOGY ADULT REF PROCEDURE ONLY     UROLOGY ADULT REFERRAL       Initially I recommended that he start Lipitor however after this encounter had completed his cholesterol came back significantly lower with a reduction in his 10-year ASCVD score to less than 7.5%.  I notified the patient that we could hold off on starting a statin at this time.    PSA level has risen and with a new symptoms associated with sexual intercourse I recommend he see Dr. Costa of urology.    Plan   -- Expected clinical course discussed   -- Medications and their side effects discussed  Patient Instructions    -- Start Lipitor   -- You should quit smoking   -- Flu shot     -- Get the new shingles vaccine series from a nurse-only visit, pharmacy or [facility] ([facility]      The 10-year ASCVD risk score ([place] DC Jr., et al., 2013) is: 9.4%    Values used to calculate the score:      Age: 54 years      Sex: Male      Is Non- : No      Diabetic: No      Tobacco smoker: Yes      Systolic Blood Pressure: 112 mmHg      Is BP treated: No      HDL Cholesterol: 46 mg/dL      Total Cholesterol: 220 mg/dL        No follow-ups on file.    Signed, Lacho Lizama MD, FAAP, FACP  Internal Medicine & Pediatrics

## 2020-01-06 NOTE — NURSING NOTE
"Chief Complaint   Patient presents with     Physical   Pt present to clinic today for a physical.    Initial /70 (BP Location: Right arm, Patient Position: Sitting, Cuff Size: Adult Regular)   Pulse 84   Temp 97.8  F (36.6  C) (Tympanic)   Resp 16   Ht 1.626 m (5' 4\")   Wt 65.8 kg (145 lb)   BMI 24.89 kg/m   Estimated body mass index is 24.89 kg/m  as calculated from the following:    Height as of this encounter: 1.626 m (5' 4\").    Weight as of this encounter: 65.8 kg (145 lb).  Medication Reconciliation: complete    Siobhan Reece LPN  "

## 2020-01-06 NOTE — NURSING NOTE
Immunization Documentation    Prior to Immunization administration, verified patients identity using patient's name and date of birth. Please see IMMUNIZATIONS  and order for additional information.  Patient / Parent instructed to remain in clinic for 15 minutes and report any adverse reaction to staff immediately.    Was entire vial of medication used? Yes  Vial/Syringe: Syringe    Evette Richard LPN  1/6/2020   3:13 PM

## 2020-01-09 ENCOUNTER — TELEPHONE (OUTPATIENT)
Dept: PEDIATRICS | Facility: OTHER | Age: 55
End: 2020-01-09

## 2020-01-15 ENCOUNTER — OFFICE VISIT (OUTPATIENT)
Dept: UROLOGY | Facility: OTHER | Age: 55
End: 2020-01-15
Attending: INTERNAL MEDICINE
Payer: COMMERCIAL

## 2020-01-15 VITALS
BODY MASS INDEX: 24.96 KG/M2 | SYSTOLIC BLOOD PRESSURE: 128 MMHG | WEIGHT: 145.4 LBS | RESPIRATION RATE: 16 BRPM | HEART RATE: 88 BPM | DIASTOLIC BLOOD PRESSURE: 78 MMHG

## 2020-01-15 DIAGNOSIS — R97.20 ELEVATED PROSTATE SPECIFIC ANTIGEN (PSA): Primary | ICD-10-CM

## 2020-01-15 DIAGNOSIS — N40.0 BENIGN PROSTATIC HYPERPLASIA WITHOUT LOWER URINARY TRACT SYMPTOMS: ICD-10-CM

## 2020-01-15 PROCEDURE — 99244 OFF/OP CNSLTJ NEW/EST MOD 40: CPT | Performed by: UROLOGY

## 2020-01-15 RX ORDER — CIPROFLOXACIN 500 MG/1
TABLET, FILM COATED ORAL
Qty: 6 TABLET | Refills: 0 | Status: SHIPPED | OUTPATIENT
Start: 2020-01-15 | End: 2020-08-05

## 2020-01-15 RX ORDER — CEFTRIAXONE 1 G/1
1 INJECTION, POWDER, FOR SOLUTION INTRAMUSCULAR; INTRAVENOUS
Status: CANCELLED | OUTPATIENT
Start: 2020-01-15

## 2020-01-15 ASSESSMENT — PAIN SCALES - GENERAL: PAINLEVEL: NO PAIN (0)

## 2020-01-15 NOTE — PROGRESS NOTES
I was asked to see this patient by Lacho Lizama MD and provide my opinion about the following:  Elevated PSA    Type of Visit  Consult    Chief Complaint  Elevated PSA    HPI  Mr. Chavarria is a 54 year old male who presents with an elevated PSA.  He does not have a family history of poorly understood prostate issues however he does not believe his father or 2 brothers had prostate cancer.  The patient has not previously undergone prostate biopsy.  No associated worsening LUTS, dysuria or prostatitis at the time of the PSA.  The most recent PSA was collected over 1 week ago.    The patient does have some BPH obstructive symptoms.  He takes Flomax on a daily basis.  He denies side effects.  He describes his urinary symptoms as moderate and bother.  He finds value with Flomax but still does have some obstructive complaints.  He denies dysuria and gross hematuria.      Past Medical History  He  has a past medical history of Nicotine dependence, uncomplicated.  Patient Active Problem List   Diagnosis     Benign prostatic hyperplasia     Bilateral carotid bruits     Gastroesophageal reflux disease     Special screening for malignant neoplasms, colon     Tobacco use disorder     Mixed hyperlipidemia     Tubular adenoma of colon     Pre-diabetes     Elevated prostate specific antigen (PSA)     Past Surgical History  He  has a past surgical history that includes Extraction(s) dental and Colonoscopy (07/2017).    Medications  He has a current medication list which includes the following prescription(s): vitamin c, aspirin, calcium polycarbophil, cvs zinc, famotidine, multivitamin, fish oil, and tamsulosin.    Allergies  Allergies   Allergen Reactions     Ibuprofen      Other reaction(s): Chest Pain       Social History  He  reports that he has been smoking cigarettes. He has a 30.00 pack-year smoking history. He has never used smokeless tobacco. He reports that he does not drink alcohol or use drugs.  No drug  abuse.    Family History  Family History   Problem Relation Age of Onset     Other - See Comments Father         BPH     Heart Disease Father 85        Heart Disease,AMI     Dementia Mother         Dementia     Arthritis Mother         Arthritis     Other - See Comments Brother         enlarged prostate     Other - See Comments Brother         x3 with BPH     Diabetes Brother         Diabetes     Prostate Cancer No family hx of         Cancer-prostate     Colon Cancer No family hx of         Cancer-colon       Review of Systems  I personally reviewed the ROS with the patient.    Nursing Notes:   Clara Cedeno LPN  1/15/2020  2:58 PM  Signed  Marvin Chavarria is a 54 year old male presenting today for: consult on elevated PSA  Medication Reconciliation: complete    Clara Biswas LPN 1/15/2020 2:45 PM    Review of Systems:    Weight loss:    No     Recent fever/chills:  No   Night sweats:   No  Current skin rash:  No   Recent hair loss:  No  Heat intolerance:  No   Cold intolerance:  No  Chest pain:   No   Palpitations:   No  Shortness of breath:  No   Wheezing:   No  Constipation:    No   Diarrhea:   No   Nausea:   No   Vomiting:   No   Kidney/side pain:  No   Back pain:   No  Frequent headaches:  No   Dizziness:     No  Leg swelling:   No   Calf pain:    No    Parents, brothers or sisters with history of kidney cancer:   No  Parents, brothers or sisters with history of bladder cancer: No  Father or brother with history of prostate cancer:  Not sure                Physical Exam  Vitals:    01/15/20 1448   BP: 128/78   Pulse: 88   Resp: 16   Weight: 66 kg (145 lb 6.4 oz)     Constitutional: No acute distress.  Alert and cooperative   Head: NCAT  Eyes: Conjunctivae normal  Cardiovascular: Regular rate and rhythm  Pulmonary/Chest: Respirations are even and non-labored bilaterally, no audible wheezing. CTAB  Abdominal: Soft. No distension, tenderness, masses or guarding.   Neurological: A + O x 3.   Cranial Nerves II-XII grossly intact.  Extremities: LAURO x 4, Warm. No clubbing.  No cyanosis.    Skin: Pink, warm and dry.  No visible rashes noted.  Psychiatric:  Normal mood and affect  Back:  No left CVA tenderness.  No right CVA tenderness.  Genitourinary:  Nonpalpable bladder    Labs  Results for KIM LIN (MRN [id number]) as of 1/15/2020 14:58   7/12/2018 09:46 1/6/2020 15:18   Prostate Specific Antigen 4.208 (H) 5.439 (H)     Results for KIM LIN (MRN [id number]) as of 1/15/2020 14:58   6/23/2017 15:26   PSA Total  4.512 (H)     Results for KIM LIN (MRN [id number]) as of 1/15/2020 14:58   7/14/2015 13:10   PSA Total  3.798 (H)     Assessment  Mr. Lin is a 54 year old male who presents with elevated PSA and BPH.    Discussed the risks of biopsy leading to overdiagnosis and overtreatment.    We discussed options regarding the elevated PSA including continuing to check serial levels, [facility] urine testing to further stratify his personal risk level, MRI of the prostate, prostate ultrasound.    We discussed the risks of biopsy possibly leading to over diagnosis and over treatment.    I explained to the patient that an elevated PSA is a marker of risk of prostate cancer and a prostate biopsy would be the next step in diagnosis.  I explained that sampling error can occur with any biopsy and there is a risk of potentially missing a cancer that may be present.    I discussed the risks, benefits, and alternatives to prostate biopsy, including hematuria, hematochezia, and hematospermia.  I also discussed the risk of diagnosing a clinically-insignificant prostate cancer.  I discussed the risks of sepsis, which can be minimized by prophylactic antibiotics.     Plan  Continue Flomax daily  Recommended TRUS biopsy of prostate in the OR with MAC sedation   Gentamicin 120mg IM prior to the biopsy.   Ciprofloxacin 500mg po bid for 3 days to start the day prior to biopsy.   I informed him to hold  aspirin for 10 days prior to biopsy.

## 2020-01-15 NOTE — NURSING NOTE
Marvin Chavarria is a 54 year old male presenting today for: consult on elevated PSA  Medication Reconciliation: complete    Clara Biswas LPN 1/15/2020 2:45 PM    Review of Systems:    Weight loss:    No     Recent fever/chills:  No   Night sweats:   No  Current skin rash:  No   Recent hair loss:  No  Heat intolerance:  No   Cold intolerance:  No  Chest pain:   No   Palpitations:   No  Shortness of breath:  No   Wheezing:   No  Constipation:    No   Diarrhea:   No   Nausea:   No   Vomiting:   No   Kidney/side pain:  No   Back pain:   No  Frequent headaches:  No   Dizziness:     No  Leg swelling:   No   Calf pain:    No    Parents, brothers or sisters with history of kidney cancer:   No  Parents, brothers or sisters with history of bladder cancer: No  Father or brother with history of prostate cancer:  Not sure

## 2020-02-03 ENCOUNTER — ANESTHESIA EVENT (OUTPATIENT)
Dept: SURGERY | Facility: OTHER | Age: 55
End: 2020-02-03
Payer: COMMERCIAL

## 2020-02-04 ENCOUNTER — ANESTHESIA (OUTPATIENT)
Dept: SURGERY | Facility: OTHER | Age: 55
End: 2020-02-04
Payer: COMMERCIAL

## 2020-02-04 ENCOUNTER — HOSPITAL ENCOUNTER (OUTPATIENT)
Facility: OTHER | Age: 55
Discharge: HOME | End: 2020-02-04
Attending: UROLOGY | Admitting: UROLOGY
Payer: COMMERCIAL

## 2020-02-04 VITALS
RESPIRATION RATE: 16 BRPM | SYSTOLIC BLOOD PRESSURE: 112 MMHG | HEART RATE: 91 BPM | DIASTOLIC BLOOD PRESSURE: 64 MMHG | OXYGEN SATURATION: 91 % | TEMPERATURE: 98 F

## 2020-02-04 DIAGNOSIS — R97.20 ELEVATED PROSTATE SPECIFIC ANTIGEN (PSA): ICD-10-CM

## 2020-02-04 PROCEDURE — 25000125 ZZHC RX 250: Performed by: UROLOGY

## 2020-02-04 PROCEDURE — 36000050 ZZH SURGERY LEVEL 2 1ST 30 MIN: Performed by: UROLOGY

## 2020-02-04 PROCEDURE — 25800030 ZZH RX IP 258 OP 636: Performed by: NURSE ANESTHETIST, CERTIFIED REGISTERED

## 2020-02-04 PROCEDURE — 76942 ECHO GUIDE FOR BIOPSY: CPT | Mod: 26 | Performed by: UROLOGY

## 2020-02-04 PROCEDURE — 55700 AS BIOPSY PROSTATE NEEDLE/PUNCH: CPT | Performed by: NURSE ANESTHETIST, CERTIFIED REGISTERED

## 2020-02-04 PROCEDURE — 71000027 ZZH RECOVERY PHASE 2 EACH 15 MINS: Performed by: UROLOGY

## 2020-02-04 PROCEDURE — 55700 ZZHC BIOPSY PROSTATE NEEDLE/PUNCH: CPT | Performed by: UROLOGY

## 2020-02-04 PROCEDURE — 27210794 ZZH OR GENERAL SUPPLY STERILE: Performed by: UROLOGY

## 2020-02-04 PROCEDURE — 25000128 H RX IP 250 OP 636: Performed by: UROLOGY

## 2020-02-04 PROCEDURE — 25000125 ZZHC RX 250: Performed by: NURSE ANESTHETIST, CERTIFIED REGISTERED

## 2020-02-04 PROCEDURE — 88305 TISSUE EXAM BY PATHOLOGIST: CPT

## 2020-02-04 PROCEDURE — 37000008 ZZH ANESTHESIA TECHNICAL FEE, 1ST 30 MIN: Performed by: UROLOGY

## 2020-02-04 PROCEDURE — 25000128 H RX IP 250 OP 636: Performed by: NURSE ANESTHETIST, CERTIFIED REGISTERED

## 2020-02-04 PROCEDURE — 40000306 ZZH STATISTIC PRE PROC ASSESS II: Performed by: UROLOGY

## 2020-02-04 RX ORDER — PROPOFOL 10 MG/ML
INJECTION, EMULSION INTRAVENOUS CONTINUOUS PRN
Status: DISCONTINUED | OUTPATIENT
Start: 2020-02-04 | End: 2020-02-04

## 2020-02-04 RX ORDER — SODIUM CHLORIDE 9 MG/ML
INJECTION, SOLUTION INTRAVENOUS CONTINUOUS
Status: DISCONTINUED | OUTPATIENT
Start: 2020-02-04 | End: 2020-02-04 | Stop reason: HOSPADM

## 2020-02-04 RX ORDER — LIDOCAINE HYDROCHLORIDE 20 MG/ML
INJECTION, SOLUTION INFILTRATION; PERINEURAL PRN
Status: DISCONTINUED | OUTPATIENT
Start: 2020-02-04 | End: 2020-02-04

## 2020-02-04 RX ORDER — GENTAMICIN SULFATE 60 MG/50ML
120 INJECTION, SOLUTION INTRAVENOUS
Status: COMPLETED | OUTPATIENT
Start: 2020-02-05 | End: 2020-02-04

## 2020-02-04 RX ORDER — LIDOCAINE HYDROCHLORIDE 20 MG/ML
JELLY TOPICAL PRN
Status: DISCONTINUED | OUTPATIENT
Start: 2020-02-04 | End: 2020-02-04 | Stop reason: HOSPADM

## 2020-02-04 RX ORDER — ONDANSETRON 4 MG/1
4 TABLET, ORALLY DISINTEGRATING ORAL EVERY 30 MIN PRN
Status: DISCONTINUED | OUTPATIENT
Start: 2020-02-04 | End: 2020-02-04 | Stop reason: HOSPADM

## 2020-02-04 RX ORDER — LIDOCAINE 40 MG/G
CREAM TOPICAL
Status: DISCONTINUED | OUTPATIENT
Start: 2020-02-04 | End: 2020-02-04 | Stop reason: HOSPADM

## 2020-02-04 RX ORDER — PROPOFOL 10 MG/ML
INJECTION, EMULSION INTRAVENOUS PRN
Status: DISCONTINUED | OUTPATIENT
Start: 2020-02-04 | End: 2020-02-04

## 2020-02-04 RX ORDER — CEFTRIAXONE SODIUM 1 G/50ML
1 INJECTION, SOLUTION INTRAVENOUS
Status: DISCONTINUED | OUTPATIENT
Start: 2020-02-04 | End: 2020-02-04

## 2020-02-04 RX ORDER — ONDANSETRON 2 MG/ML
4 INJECTION INTRAMUSCULAR; INTRAVENOUS EVERY 30 MIN PRN
Status: DISCONTINUED | OUTPATIENT
Start: 2020-02-04 | End: 2020-02-04 | Stop reason: HOSPADM

## 2020-02-04 RX ORDER — NALOXONE HYDROCHLORIDE 0.4 MG/ML
.1-.4 INJECTION, SOLUTION INTRAMUSCULAR; INTRAVENOUS; SUBCUTANEOUS
Status: DISCONTINUED | OUTPATIENT
Start: 2020-02-04 | End: 2020-02-04 | Stop reason: HOSPADM

## 2020-02-04 RX ORDER — FENTANYL CITRATE 50 UG/ML
25-50 INJECTION, SOLUTION INTRAMUSCULAR; INTRAVENOUS
Status: DISCONTINUED | OUTPATIENT
Start: 2020-02-04 | End: 2020-02-04 | Stop reason: HOSPADM

## 2020-02-04 RX ORDER — MEPERIDINE HYDROCHLORIDE 50 MG/ML
12.5 INJECTION INTRAMUSCULAR; INTRAVENOUS; SUBCUTANEOUS
Status: DISCONTINUED | OUTPATIENT
Start: 2020-02-04 | End: 2020-02-04 | Stop reason: HOSPADM

## 2020-02-04 RX ADMIN — GENTAMICIN SULFATE 120 MG: 60 INJECTION, SOLUTION INTRAVENOUS at 10:05

## 2020-02-04 RX ADMIN — SODIUM CHLORIDE: 9 INJECTION, SOLUTION INTRAVENOUS at 09:30

## 2020-02-04 RX ADMIN — LIDOCAINE HYDROCHLORIDE 60 MG: 20 INJECTION, SOLUTION INFILTRATION; PERINEURAL at 10:05

## 2020-02-04 RX ADMIN — PROPOFOL 140 MCG/KG/MIN: 10 INJECTION, EMULSION INTRAVENOUS at 10:05

## 2020-02-04 RX ADMIN — PROPOFOL 50 MG: 10 INJECTION, EMULSION INTRAVENOUS at 10:05

## 2020-02-04 ASSESSMENT — LIFESTYLE VARIABLES: TOBACCO_USE: 1

## 2020-02-04 NOTE — ANESTHESIA PREPROCEDURE EVALUATION
Anesthesia Pre-Procedure Evaluation    Patient: Marvin Chavarria   MRN: [id number] : 1965          Preoperative Diagnosis: Elevated prostate specific antigen (PSA) [R97.20]    Procedure(s):  BIOPSY, PROSTATE, RECTAL APPROACH    Past Medical History:   Diagnosis Date     Nicotine dependence, uncomplicated     No Comments Provided     Past Surgical History:   Procedure Laterality Date     COLONOSCOPY  2017    follow up , tubular adenoma     EXTRACTION(S) DENTAL      1981       Anesthesia Evaluation     . Pt has had prior anesthetic.     No history of anesthetic complications          ROS/MED HX    ENT/Pulmonary:     (+)tobacco use, Current use 1 packs/day  , . .    Neurologic:  - neg neurologic ROS     Cardiovascular:     (+) Dyslipidemia, ----. : . . . :. .       METS/Exercise Tolerance:  >4 METS   Hematologic:  - neg hematologic  ROS       Musculoskeletal:  - neg musculoskeletal ROS       GI/Hepatic:     (+) GERD Asymptomatic on medication,       Renal/Genitourinary:  - ROS Renal section negative       Endo:  - neg endo ROS       Psychiatric:  - neg psychiatric ROS       Infectious Disease:  - neg infectious disease ROS       Malignancy:      - no malignancy   Other:    - neg other ROS                      Physical Exam  Normal systems: cardiovascular, pulmonary and dental    Airway   Mallampati: II  TM distance: >3 FB  Neck ROM: full    Dental     Cardiovascular   Rhythm and rate: regular and normal      Pulmonary    breath sounds clear to auscultation            Lab Results   Component Value Date    HGB 14.6 2017    HCT 43.9 2017     2017     2020    POTASSIUM 3.9 2020    CHLORIDE 104 2020    CO2 29 2020    BUN 10 2020    CR 0.94 2020     (H) 2020    STEVE 9.0 2020    ALBUMIN 4.4 2015    PROTTOTAL 7.2 2015    ALKPHOS 34 2015    BILITOTAL 0.7 2015    BILIDIRECT 0.14 2015       Preop  "Vitals  BP Readings from Last 3 Encounters:   02/04/20 112/64   01/15/20 128/78   01/06/20 112/70    Pulse Readings from Last 3 Encounters:   01/15/20 88   01/06/20 84   07/12/18 88      Resp Readings from Last 3 Encounters:   02/04/20 16   01/15/20 16   01/06/20 16    SpO2 Readings from Last 3 Encounters:   02/04/20 95%   06/30/14 95%      Temp Readings from Last 1 Encounters:   02/04/20 97.5  F (36.4  C) (Tympanic)    Ht Readings from Last 1 Encounters:   01/06/20 1.626 m (5' 4\")      Wt Readings from Last 1 Encounters:   01/15/20 66 kg (145 lb 6.4 oz)    Estimated body mass index is 24.96 kg/m  as calculated from the following:    Height as of 1/6/20: 1.626 m (5' 4\").    Weight as of 1/15/20: 66 kg (145 lb 6.4 oz).       Anesthesia Plan      History & Physical Review      ASA Status:  2 .    NPO Status:  > 6 hours    Plan for MAC with Propofol induction.          Postoperative Care      Consents  Anesthetic plan, risks, benefits and alternatives discussed with:  Patient..                 LYNN SMITH CRNA  "

## 2020-02-04 NOTE — OP NOTE
"Preoperative diagnosis  Elevated PSA    Postoperative diagnosis  Elevated PSA    Procedure  Prostate biopsy  Transrectal ultrasound of the prostate  Transrectal ultrasound guidance of needle biopsy    Surgeon  Daniel Costa MD    Surgeon(s)/Proceduralist(s) and Assistants (if any)  Surgeon(s):  Daniel Costa MD  Circulator: Rakel Wilson RN  Scrub Person: Jessica Cuellar RN  2nd Scrub: Bhumika Cabello  Pre-Op Nurse: Ever Cobos RN    (EBL) Estimated blood loss (ml)  5    Anesthesia  MAC  2% lidocaine solution, periprostatic injection, 10mL    Complications  None    Specimen  Prostate biopsy x 12 cores    Indications  Mr. Chavarria is a 54 year old year old male with an elevated PSA.  After discussing his options, the patient decided to proceed with prostate biopsy.  Informed consent was obtained. Possible complications were discussed with the patient during his last visit including, but not limited to, hematuria, hematochezia, prostatitis, urinary tract infection, sepsis, and urinary retention.    Exam  Prostate:   40 grams, symmetric, no nodules or induration      Procedure  The patient was positioned and prepped in a left lateral position with lower extremities flexed.  Lidocaine jelly, 2%, was injected per rectum and gentamicin 120mg was injected intramuscularly. NINA was performed.  The rectal ultrasound probe was slowly introduced into the rectum.  A 22 gauge, 8\" needle was used to perform a periprostatic injection of lidocaine 1%, 10mL through the ultrasound probe.  The prostate and seminal vesicles were inspected systematically using cross and sagittal views with the ultrasound.  There  were not hypoechoic areas within the prostate.  The dimensions of the prostate were measured to be 47mm X 41mm X 46mm, for a calculated volume of 47g.  Using a true cut 14 Fr biopsy needle, 12 prostate cores were collected. The specific locations on the left were the following: lateral base, lateral mid, lateral apex, " medial base, medial mid, and medial apex.  The right side was sampled in a similar manner.  The ultrasound probe was removed.  The patient tolerated the procedure well.    Plan  Complete course of antibiotics and follow up in about 1 week for path report.

## 2020-02-04 NOTE — ANESTHESIA CARE TRANSFER NOTE
Patient: Marvin Chavarria    Procedure(s):  BIOPSY, PROSTATE, RECTAL APPROACH    Diagnosis: Elevated prostate specific antigen (PSA) [R97.20]  Diagnosis Additional Information: No value filed.    Anesthesia Type:   MAC     Note:  Airway :Nasal Cannula  Patient transferred to:Phase II  Handoff Report: Identifed the Patient, Identified the Reponsible Provider, Reviewed the pertinent medical history, Discussed the surgical course, Reviewed Intra-OP anesthesia mangement and issues during anesthesia, Set expectations for post-procedure period and Allowed opportunity for questions and acknowledgement of understanding      Vitals: (Last set prior to Anesthesia Care Transfer)    CRNA VITALS  2/4/2020 0956 - 2/4/2020 1026      2/4/2020             Pulse:  83    Ht Rate:  83    SpO2:  96 %    Resp Rate (set):  10    EKG:  NSR                Electronically Signed By: LYNN Wang CRNA  February 4, 2020  10:26 AM

## 2020-02-12 ENCOUNTER — OFFICE VISIT (OUTPATIENT)
Dept: UROLOGY | Facility: OTHER | Age: 55
End: 2020-02-12
Attending: UROLOGY
Payer: COMMERCIAL

## 2020-02-12 VITALS
RESPIRATION RATE: 16 BRPM | HEART RATE: 82 BPM | DIASTOLIC BLOOD PRESSURE: 76 MMHG | SYSTOLIC BLOOD PRESSURE: 118 MMHG | WEIGHT: 145 LBS | BODY MASS INDEX: 24.89 KG/M2

## 2020-02-12 DIAGNOSIS — R97.20 ELEVATED PROSTATE SPECIFIC ANTIGEN (PSA): Primary | ICD-10-CM

## 2020-02-12 PROCEDURE — 99212 OFFICE O/P EST SF 10 MIN: CPT | Performed by: UROLOGY

## 2020-02-12 ASSESSMENT — PAIN SCALES - GENERAL: PAINLEVEL: NO PAIN (0)

## 2020-02-12 NOTE — NURSING NOTE
Pt presents to clinic for path review    Review of Systems:    Weight loss:    No     Recent fever/chills:  No   Night sweats:   No  Current skin rash:  No   Recent hair loss:  No  Heat intolerance:  No   Cold intolerance:  No  Chest pain:   No   Palpitations:   No  Shortness of breath:  No   Wheezing:   No  Constipation:    No   Diarrhea:   No   Nausea:   No   Vomiting:   No   Kidney/side pain:  No   Back pain:   No  Frequent headaches:  No   Dizziness:     No  Leg swelling:   No   Calf pain:    No

## 2020-02-12 NOTE — PROGRESS NOTES
Type of Visit  Established    Chief Complaint  Elevated PSA    HPI  Mr. Chavarria is a 54 year old male who is status post TRUS guided biopsy of the prostate.    He is doing well and denies current rectal bleeding or gross hematuria.   He denies fevers or chills.   Patient follows up today for pathology review.      Review of Systems  I reviewed the ROS with the patient.    Nursing Notes:   Harriet Ramirez LPN  2/12/2020 10:41 AM  Signed  Pt presents to clinic for path review    Review of Systems:    Weight loss:    No     Recent fever/chills:  No   Night sweats:   No  Current skin rash:  No   Recent hair loss:  No  Heat intolerance:  No   Cold intolerance:  No  Chest pain:   No   Palpitations:   No  Shortness of breath:  No   Wheezing:   No  Constipation:    No   Diarrhea:   No   Nausea:   No   Vomiting:   No   Kidney/side pain:  No   Back pain:   No  Frequent headaches:  No   Dizziness:     No  Leg swelling:   No   Calf pain:    No          Family History  Family History   Problem Relation Age of Onset     Other - See Comments Father         BPH     Heart Disease Father 85        Heart Disease,AMI     Dementia Mother         Dementia     Arthritis Mother         Arthritis     Other - See Comments Brother         enlarged prostate     Other - See Comments Brother         x3 with BPH     Diabetes Brother         Diabetes     Prostate Cancer No family hx of         Cancer-prostate     Colon Cancer No family hx of         Cancer-colon       Physical Exam  Vitals:    02/12/20 1009   BP: 118/76   BP Location: Left arm   Patient Position: Sitting   Cuff Size: Adult Regular   Pulse: 82   Resp: 16   Weight: 65.8 kg (145 lb)     Constitutional: NAD, WDWN.  Cardiovascular: Regular rate.  Pulmonary/Chest: Respirations are even and non-labored bilaterally.  Abdominal: Soft. No distension, tenderness, masses or guarding. No CVA tenderness.  Extremities: LAURO x 4, Warm. No clubbing.  No cyanosis.    Skin: Pink, warm and dry.  No  francoise noted.    Labs  Results for KIM LIN (MRN [id number]) as of 1/15/2020 14:58   7/12/2018 09:46 1/6/2020 15:18   Prostate Specific Antigen 4.208 (H) 5.439 (H)     Results for KIM LIN (MRN [id number]) as of 1/15/2020 14:58   6/23/2017 15:26   PSA Total  4.512 (H)     Results for KIM LIN (MRN [id number]) as of 1/15/2020 14:58   7/14/2015 13:10   PSA Total  3.798 (H)     Pathology  2/4/2020  Negative 12 cores out of 12 cores  Calculated prostate volume based on TRUS: 47 grams    Assessment  Mr. Lin is a 54 year old male who is status post prostate biopsy.    Favorable PSA density.  I discussed the pathology report, which revealed no evidence of cancer.  Given his strong family history of prostate cancer I recommended I continue to follow the patient annually.    Plan  Continue Flomax daily  Follow-up annually with a PSA

## 2020-02-25 DIAGNOSIS — N40.0 BENIGN PROSTATIC HYPERPLASIA WITHOUT LOWER URINARY TRACT SYMPTOMS: ICD-10-CM

## 2020-02-26 RX ORDER — TAMSULOSIN HYDROCHLORIDE 0.4 MG/1
0.4 CAPSULE ORAL DAILY
Qty: 30 CAPSULE | Refills: 1 | Status: SHIPPED | OUTPATIENT
Start: 2020-02-26 | End: 2020-04-17

## 2020-02-26 NOTE — TELEPHONE ENCOUNTER
Prescription approved per [facility] Refill Protocol.    Last refill  Flomax 0.4mg  1/6/2020  30# 0 Refills    LOV:2/12/2020  Bhumika Doshi RN on 2/26/2020 at 12:08 PM

## 2020-04-17 ENCOUNTER — MYC REFILL (OUTPATIENT)
Dept: PEDIATRICS | Facility: OTHER | Age: 55
End: 2020-04-17

## 2020-04-17 DIAGNOSIS — N40.0 BENIGN PROSTATIC HYPERPLASIA WITHOUT LOWER URINARY TRACT SYMPTOMS: ICD-10-CM

## 2020-04-20 RX ORDER — TAMSULOSIN HYDROCHLORIDE 0.4 MG/1
0.4 CAPSULE ORAL DAILY
Qty: 30 CAPSULE | Refills: 1 | Status: SHIPPED | OUTPATIENT
Start: 2020-04-20 | End: 2020-06-23

## 2020-06-11 DIAGNOSIS — Z12.11 ENCOUNTER FOR SCREENING COLONOSCOPY: Primary | ICD-10-CM

## 2020-06-11 RX ORDER — POLYETHYLENE GLYCOL 3350, SODIUM CHLORIDE, SODIUM BICARBONATE, POTASSIUM CHLORIDE 420; 11.2; 5.72; 1.48 G/4L; G/4L; G/4L; G/4L
4000 POWDER, FOR SOLUTION ORAL ONCE
Qty: 4000 ML | Refills: 0 | Status: SHIPPED | OUTPATIENT
Start: 2020-06-11 | End: 2020-06-11

## 2020-06-11 RX ORDER — BISACODYL 5 MG
TABLET, DELAYED RELEASE (ENTERIC COATED) ORAL
Qty: 2 TABLET | Refills: 0 | Status: SHIPPED | OUTPATIENT
Start: 2020-06-11 | End: 2021-03-15

## 2020-06-11 NOTE — TELEPHONE ENCOUNTER
Screening Questions for the Scheduling of Screening Colonoscopies   (If Colonoscopy is diagnostic, Provider should review the chart before scheduling.)  Are you younger than 50 or older than 80?  NO   Do you take aspirin or fish oil?  YES - BOTH  (if yes, tell patient to stop 1 week prior to Colonoscopy)  Do you take warfarin (Coumadin), clopidogrel (Plavix), apixaban (Eliquis), dabigatram (Pradaxa), rivaroxaban (Xarelto) or any blood thinner? NO   Do you use oxygen at home?  NO   Do you have kidney disease? NO   Are you on dialysis? NO   Have you had a stroke or heart attack in the last year? NO   Have you had a stent in your heart or any blood vessel in the last year? NO   Have you had a transplant of any organ? NO   Have you had a colonoscopy or upper endoscopy (EGD) before? YES          When?  2017  Date of scheduled Colonoscopy. 07/09/2020  Provider SANTOSH   Pharmacy [facility]

## 2020-06-22 DIAGNOSIS — N40.0 BENIGN PROSTATIC HYPERPLASIA WITHOUT LOWER URINARY TRACT SYMPTOMS: ICD-10-CM

## 2020-06-22 NOTE — LETTER
June 23, 2020      Marvni Chavarria  [address] [employer] [id number]        Dear Marvin,     Your pharmacy has requested a refill of Tamsulosin  which has been forwarded to Dr. Lizama for consideration.     According to our records, you are overdue for annual medication management and labs. Please contact our scheduling line at [phone] to set up this appointment at your earliest convenience.     Thank you for choosing [facility] for your health care needs.       Sincerely,        The Refill Nurse

## 2020-06-23 RX ORDER — TAMSULOSIN HYDROCHLORIDE 0.4 MG/1
0.4 CAPSULE ORAL DAILY
Qty: 30 CAPSULE | Refills: 1 | Status: SHIPPED | OUTPATIENT
Start: 2020-06-23 | End: 2020-08-19

## 2020-06-23 NOTE — TELEPHONE ENCOUNTER
Prescription approved per [facility] Refill Protocol.  Last refill  Tamsulosin 0.4mg capsule  30# 1 Refill  4/20/2020    LOV: 7/12/2018  No future appointments  -Sending reminder letter to patient for visit   Bhumika Doshi RN on 6/23/2020 at 11:50 AM

## 2020-08-09 ENCOUNTER — ALLIED HEALTH/NURSE VISIT (OUTPATIENT)
Dept: FAMILY MEDICINE | Facility: OTHER | Age: 55
End: 2020-08-09
Attending: INTERNAL MEDICINE
Payer: COMMERCIAL

## 2020-08-09 DIAGNOSIS — Z12.11 ENCOUNTER FOR SCREENING COLONOSCOPY: ICD-10-CM

## 2020-08-09 PROCEDURE — U0003 INFECTIOUS AGENT DETECTION BY NUCLEIC ACID (DNA OR RNA); SEVERE ACUTE RESPIRATORY SYNDROME CORONAVIRUS 2 (SARS-COV-2) (CORONAVIRUS DISEASE [COVID-19]), AMPLIFIED PROBE TECHNIQUE, MAKING USE OF HIGH THROUGHPUT TECHNOLOGIES AS DESCRIBED BY CMS-2020-01-R: HCPCS | Mod: ZL | Performed by: SURGERY

## 2020-08-09 PROCEDURE — 99207 ZZC NO CHARGE NURSE ONLY: CPT

## 2020-08-09 PROCEDURE — C9803 HOPD COVID-19 SPEC COLLECT: HCPCS

## 2020-08-09 NOTE — NURSING NOTE
Chief Complaint   Patient presents with     Covid 19 Testing     Patient swabbed for COVID-19 testing.  Julio Espinal LPN on 8/9/2020 at 11:39 AM

## 2020-08-10 LAB
SARS-COV-2 RNA SPEC QL NAA+PROBE: NOT DETECTED
SPECIMEN SOURCE: NORMAL

## 2020-08-12 ENCOUNTER — ANESTHESIA EVENT (OUTPATIENT)
Dept: SURGERY | Facility: OTHER | Age: 55
End: 2020-08-12
Payer: COMMERCIAL

## 2020-08-12 ENCOUNTER — HOSPITAL ENCOUNTER (OUTPATIENT)
Facility: OTHER | Age: 55
Discharge: HOME | End: 2020-08-12
Attending: SURGERY | Admitting: SURGERY
Payer: COMMERCIAL

## 2020-08-12 ENCOUNTER — ANESTHESIA (OUTPATIENT)
Dept: SURGERY | Facility: OTHER | Age: 55
End: 2020-08-12
Payer: COMMERCIAL

## 2020-08-12 VITALS
SYSTOLIC BLOOD PRESSURE: 123 MMHG | DIASTOLIC BLOOD PRESSURE: 84 MMHG | TEMPERATURE: 97.3 F | HEART RATE: 81 BPM | OXYGEN SATURATION: 93 % | RESPIRATION RATE: 16 BRPM

## 2020-08-12 DIAGNOSIS — K63.5 POLYP OF SIGMOID COLON, UNSPECIFIED TYPE: Primary | ICD-10-CM

## 2020-08-12 PROCEDURE — 25000125 ZZHC RX 250: Performed by: NURSE ANESTHETIST, CERTIFIED REGISTERED

## 2020-08-12 PROCEDURE — 25000128 H RX IP 250 OP 636: Performed by: NURSE ANESTHETIST, CERTIFIED REGISTERED

## 2020-08-12 PROCEDURE — 25000125 ZZHC RX 250: Performed by: SURGERY

## 2020-08-12 PROCEDURE — 25800030 ZZH RX IP 258 OP 636: Performed by: SURGERY

## 2020-08-12 PROCEDURE — 45380 COLONOSCOPY AND BIOPSY: CPT | Performed by: NURSE ANESTHETIST, CERTIFIED REGISTERED

## 2020-08-12 PROCEDURE — 45380 COLONOSCOPY AND BIOPSY: CPT | Performed by: SURGERY

## 2020-08-12 PROCEDURE — 88305 TISSUE EXAM BY PATHOLOGIST: CPT

## 2020-08-12 PROCEDURE — 45380 COLONOSCOPY AND BIOPSY: CPT | Mod: PT | Performed by: SURGERY

## 2020-08-12 RX ORDER — NALOXONE HYDROCHLORIDE 0.4 MG/ML
.1-.4 INJECTION, SOLUTION INTRAMUSCULAR; INTRAVENOUS; SUBCUTANEOUS
Status: CANCELLED | OUTPATIENT
Start: 2020-08-12 | End: 2020-08-13

## 2020-08-12 RX ORDER — PROPOFOL 10 MG/ML
INJECTION, EMULSION INTRAVENOUS PRN
Status: DISCONTINUED | OUTPATIENT
Start: 2020-08-12 | End: 2020-08-12

## 2020-08-12 RX ORDER — SODIUM CHLORIDE, SODIUM LACTATE, POTASSIUM CHLORIDE, CALCIUM CHLORIDE 600; 310; 30; 20 MG/100ML; MG/100ML; MG/100ML; MG/100ML
INJECTION, SOLUTION INTRAVENOUS CONTINUOUS
Status: DISCONTINUED | OUTPATIENT
Start: 2020-08-12 | End: 2020-08-12 | Stop reason: HOSPADM

## 2020-08-12 RX ORDER — LIDOCAINE 40 MG/G
CREAM TOPICAL
Status: DISCONTINUED | OUTPATIENT
Start: 2020-08-12 | End: 2020-08-12 | Stop reason: HOSPADM

## 2020-08-12 RX ORDER — PROPOFOL 10 MG/ML
INJECTION, EMULSION INTRAVENOUS CONTINUOUS PRN
Status: DISCONTINUED | OUTPATIENT
Start: 2020-08-12 | End: 2020-08-12

## 2020-08-12 RX ORDER — LIDOCAINE HYDROCHLORIDE 20 MG/ML
INJECTION, SOLUTION INFILTRATION; PERINEURAL PRN
Status: DISCONTINUED | OUTPATIENT
Start: 2020-08-12 | End: 2020-08-12

## 2020-08-12 RX ORDER — ONDANSETRON 2 MG/ML
4 INJECTION INTRAMUSCULAR; INTRAVENOUS EVERY 6 HOURS PRN
Status: CANCELLED | OUTPATIENT
Start: 2020-08-12

## 2020-08-12 RX ORDER — ONDANSETRON 2 MG/ML
4 INJECTION INTRAMUSCULAR; INTRAVENOUS
Status: DISCONTINUED | OUTPATIENT
Start: 2020-08-12 | End: 2020-08-12 | Stop reason: HOSPADM

## 2020-08-12 RX ORDER — FLUMAZENIL 0.1 MG/ML
0.2 INJECTION, SOLUTION INTRAVENOUS
Status: CANCELLED | OUTPATIENT
Start: 2020-08-12 | End: 2020-08-12

## 2020-08-12 RX ORDER — ONDANSETRON 4 MG/1
4 TABLET, ORALLY DISINTEGRATING ORAL EVERY 6 HOURS PRN
Status: CANCELLED | OUTPATIENT
Start: 2020-08-12

## 2020-08-12 RX ADMIN — LIDOCAINE HYDROCHLORIDE 60 MG: 20 INJECTION, SOLUTION INFILTRATION; PERINEURAL at 08:04

## 2020-08-12 RX ADMIN — PROPOFOL 60 MG: 10 INJECTION, EMULSION INTRAVENOUS at 08:04

## 2020-08-12 RX ADMIN — SODIUM CHLORIDE, POTASSIUM CHLORIDE, SODIUM LACTATE AND CALCIUM CHLORIDE: 600; 310; 30; 20 INJECTION, SOLUTION INTRAVENOUS at 07:48

## 2020-08-12 RX ADMIN — PROPOFOL 130 MCG/KG/MIN: 10 INJECTION, EMULSION INTRAVENOUS at 08:04

## 2020-08-12 ASSESSMENT — LIFESTYLE VARIABLES: TOBACCO_USE: 1

## 2020-08-12 NOTE — ANESTHESIA POSTPROCEDURE EVALUATION
Patient: Marvin Chavarria    Procedure(s):  COLONOSCOPY, WITH POLYPECTOMY AND BIOPSY    Diagnosis:History of colon polyps [Z86.010]  Diagnosis Additional Information: No value filed.    Anesthesia Type:  MAC    Note:  Anesthesia Post Evaluation    Patient location during evaluation: Phase 2  Patient participation: Able to fully participate in evaluation  Level of consciousness: awake and alert  Pain management: adequate  Airway patency: patent  Cardiovascular status: acceptable  Respiratory status: acceptable  Hydration status: acceptable  PONV: none     Anesthetic complications: None          Last vitals:  Vitals:    08/12/20 0827 08/12/20 0830 08/12/20 0845   BP: 91/55 96/58 123/84   Pulse: 82 80 81   Resp:      Temp: 97.3  F (36.3  C)     SpO2: 95% 94% 93%         Electronically Signed By: LYNN SMITH CRNA  August 12, 2020  9:15 AM

## 2020-08-12 NOTE — H&P
PRE-PROCEDURE NOTE    CHIEF COMPLAINT / REASON FORPROCEDURE:  Need for screening colonoscopy.    PERTINENT HISTORY   Patient with no complaints. Previous colonoscopy 2017-tubular adenoma. No diarrhea, constipation, abdominal pain or rectal bleeding. No family history of colon polyps or colon cancer.  Past Medical History:   Diagnosis Date     Nicotine dependence, uncomplicated     No Comments Provided     Past Surgical History:   Procedure Laterality Date     BIOPSY PROSTATE TRANSRECTAL N/A 2020    Procedure: BIOPSY, PROSTATE, RECTAL APPROACH;  Surgeon: Daniel Costa MD;  Location:  OR     COLONOSCOPY  2017    follow up , tubular adenoma     EXTRACTION(S) DENTAL      1981     Other:  None  Bleeding tendencies:  No    Relevant Family History:  none    Relevant Social History:  none    A relevant review of systems was performed and was Negative.    ALLERGIES/SENSITIVITIES:   Allergies   Allergen Reactions     Ibuprofen      Other reaction(s): Chest Pain        CURRENTMEDICATIONS:    No current facility-administered medications on file prior to encounter.   ascorbic acid (VITAMIN C) 1000 MG TABS,   famotidine (PEPCID) 10 MG tablet, Take 1 tablet (10 mg) by mouth 2 times daily  aspirin (ASA) 81 MG EC tablet, Take 1 tablet (81 mg) by mouth daily  bisacodyl (DULCOLAX) 5 MG EC tablet, Use as directed per colonoscopy prep.  calcium polycarbophil (EQ FIBER THERAPY) 625 MG tablet, Take 2 tablets by mouth daily  [employer] ZINC 50 MG TABS,   Multiple Vitamin (MULTIVITAMIN  S) CAPS, Take 1 capsule by mouth daily  Omega-3 Fatty Acids (FISH OIL) 1200 MG capsule, Take 1,200 mg by mouth daily  [] polyethylene glycol-electrolytes (NULYTELY) 420 g solution, Take 4,000 mLs by mouth once for 1 dose      Current Facility-Administered Medications   Medication     lactated ringers infusion     lidocaine (LMX4) cream     lidocaine 1 % 0.1-1 mL     ondansetron (ZOFRAN) injection 4 mg     sodium chloride (PF) 0.9% PF flush 3 mL      sodium chloride (PF) 0.9% PF flush 3 mL       PRE-SEDATION ASSESSMENT:    /67   Pulse 85   Temp 97.8  F (36.6  C) (Tympanic)   Resp 16   SpO2 95%   Lung Exam:  Normal  Heart Exam:  Normal    Comment(s):      IMPRESSION:  Need for screening colonoscopy.    PLAN:  I discussed screening colonoscopy with the patient.

## 2020-08-12 NOTE — OR NURSING
patient has been discharged to home at 0900 via ambulatory accompanied by RN and wife    Written discharge instructions were provided to patient.  Prescriptions were none.      Patient and adult caring for them verbalize understanding of discharge instructions including no driving until tomorrow and no longer taking narcotic pain medications - no operating mechanical equipment and no making any important decisions.They understand reason for discharge, and necessary follow-up appointments.

## 2020-08-12 NOTE — ANESTHESIA CARE TRANSFER NOTE
Patient: Marvin Chavarria    Procedure(s):  COLONOSCOPY, WITH POLYPECTOMY AND BIOPSY    Diagnosis: History of colon polyps [Z86.010]  Diagnosis Additional Information: No value filed.    Anesthesia Type:   MAC     Note:    Patient transferred to:Phase II  Handoff Report: Identifed the Patient, Identified the Reponsible Provider, Reviewed the pertinent medical history, Discussed the surgical course, Reviewed Intra-OP anesthesia mangement and issues during anesthesia, Set expectations for post-procedure period and Allowed opportunity for questions and acknowledgement of understanding      Vitals: (Last set prior to Anesthesia Care Transfer)    CRNA VITALS  8/12/2020 0752 - 8/12/2020 0825      8/12/2020             Resp Rate (set):  10                Electronically Signed By: LYNN SMITH CRNA  August 12, 2020  8:25 AM

## 2020-08-12 NOTE — DISCHARGE INSTRUCTIONS
Procedure you had done: colonoscopy with removal of polyp  Your health care provider is:  Lacho Lizama  Your surgeon is Dr. Rachel Cardona.   Please call your health care provider or surgeon at [phone] if:    - you feel you are getting worse or having an increase in problems    - fever greater than 101 degrees  - increasing shortness of breath or chest pain  - any signs of infection (increasing redness, swelling, tenderness, warmth, change in appearance, or  increased drainage)  - blood in your urine or stool  - coughing or vomiting blood  - nausea (upset stomach) and vomiting and/or diarrhea that will not stop  - severe pain that is not relieved by medicine, rest or ice  You have had medications for sedation. Please be aware that this can cause drowsiness and impaired judgment for up to 24 hours after your procedure. Do not drive, operate power tools or drink alcohol for 24 hours.  If samples were taken-you will get a phone call and a letter with your results in the next 7-10 days. If you don't get results, please call and let us know!     [facility]  Adult Discharge Orders & Instructions    ________________________________________________________________          For 12 hours after surgery  1. Get plenty of rest.  A responsible adult must stay with you for at least 12 hours after you leave the hospital.   2. You may feel lightheaded.  IF so, sit for a few minutes before standing.  Have someone help you get up.   3. You may have a slight fever. Call the doctor if your fever is over 101 F (38.3 C) (taken under the tongue) or lasts longer than 24 hours.  4. You may have a dry mouth, a sore throat, muscle aches or trouble sleeping.  These should go away after 24 hours.  5. Do not make important or legal decisions.  6.   Do not drive or use heavy equipment.  If you have weakness or tingling, don't drive or use heavy equipment until this feeling goes away.    To contact a doctor, call    [phone]_______________________

## 2020-08-12 NOTE — OP NOTE
PROCEDURE NOTE    SURGEON: Rachel Day MD.    PRE-OP DIAGNOSIS:  Screening Colonoscopy      POST-OP DIAGNOSIS: colon polyp     Location: Sigmoid Size: 0.3 cm  Removed:  Y    PROCEDURE:  Colonoscopy with polypectomy cold forceps    ESTIMATEDBLOOD LOSS: none    COMPLICATIONS:  None    SPECIMEN:  Sigmoid polyp    ANESTHESIA:  See anesthesia note    INDICATION FOR THE PROCEDURE: The patient is a 54 year old male. The patient has no complaints. I explained to the patient the risks, benefits and alternatives to screening colonoscopy for evaluating the colon for colon polyps and colon cancer. We specifically discussed the risks of bleeding, infection, perforation, potential inability to reach the cecum and the risks of sedation. The patient's questions were answered and the patient wished to proceed. Informed consent paperwork was completed.    PROCEDURE: The patient was taken to the endoscopy suite. Appropriate monitors were attached. The patient was placed in the left lateral decubitus position.Timeout was performed confirming the patient's identity and procedure to be performed. After appropriate sedation was confirmed, digital rectal exam was performed. There was normal tone and no gross abnormality was noted. The lubricated colonoscope was introduced into the anus the colon was insufflated with air. The prep quality was adequate. Under direct visualization the scope was advanced to the cecum. The ileocecal valve was intubated and the terminal ileum inspected. No gross abnormality was noted. The scope was withdrawn back into the cecum. The mucosa of colon was inspected while withdrawing the scope. A small flat polyp was noted in the sigmoid colon and removed with cold forceps. The scope was retroflexed in the rectum and the anorectal junction was inspected. No abnormalities were noted. The scope was returned to a neutral position and the colon was decompressed. The scope was removed. The patient tolerated the  procedure with no immediately apparent complication. The patient was taken to recovery in stable condition.  FOLLOW UP:  RECOMMEND high fiber diet, follow up: will call with pathology results.

## 2020-08-12 NOTE — ANESTHESIA PREPROCEDURE EVALUATION
Anesthesia Pre-Procedure Evaluation    Patient: Marvin Chavarria   MRN: [id number] : 1965          Preoperative Diagnosis: History of colon polyps [Z86.010]    Procedure(s):  COLONOSCOPY    Past Medical History:   Diagnosis Date     Nicotine dependence, uncomplicated     No Comments Provided     Past Surgical History:   Procedure Laterality Date     BIOPSY PROSTATE TRANSRECTAL N/A 2020    Procedure: BIOPSY, PROSTATE, RECTAL APPROACH;  Surgeon: Daniel Costa MD;  Location: [facility] OR     COLONOSCOPY  2017    follow up , tubular adenoma     EXTRACTION(S) DENTAL             Anesthesia Evaluation     . Pt has had prior anesthetic.     No history of anesthetic complications          ROS/MED HX    ENT/Pulmonary:     (+)tobacco use, Current use 1 packs/day  , . .    Neurologic:  - neg neurologic ROS     Cardiovascular:     (+) Dyslipidemia, ----. : . . . :. .       METS/Exercise Tolerance:  >4 METS   Hematologic:  - neg hematologic  ROS       Musculoskeletal:  - neg musculoskeletal ROS       GI/Hepatic:     (+) GERD Asymptomatic on medication,       Renal/Genitourinary:  - ROS Renal section negative       Endo:  - neg endo ROS       Psychiatric:  - neg psychiatric ROS       Infectious Disease:  - neg infectious disease ROS       Malignancy:      - no malignancy   Other:    - neg other ROS                      Physical Exam  Normal systems: cardiovascular, pulmonary and dental    Airway   Mallampati: II  TM distance: >3 FB  Neck ROM: full    Dental     Cardiovascular   Rhythm and rate: regular and normal      Pulmonary    breath sounds clear to auscultation            Lab Results   Component Value Date    HGB 14.6 2017    HCT 43.9 2017     2017     2020    POTASSIUM 3.9 2020    CHLORIDE 104 2020    CO2 29 2020    BUN 10 2020    CR 0.94 2020     (H) 2020    STEVE 9.0 2020    ALBUMIN 4.4 2015    PROTTOTAL 7.2 2015  "   ALKPHOS 34 07/14/2015    BILITOTAL 0.7 07/14/2015    BILIDIRECT 0.14 07/14/2015       Preop Vitals  BP Readings from Last 3 Encounters:   08/12/20 114/67   02/12/20 118/76   02/04/20 112/64    Pulse Readings from Last 3 Encounters:   08/12/20 85   02/12/20 82   02/04/20 91      Resp Readings from Last 3 Encounters:   08/12/20 16   02/12/20 16   02/04/20 16    SpO2 Readings from Last 3 Encounters:   08/12/20 95%   02/04/20 91%   06/30/14 95%      Temp Readings from Last 1 Encounters:   08/12/20 97.8  F (36.6  C) (Tympanic)    Ht Readings from Last 1 Encounters:   01/06/20 1.626 m (5' 4\")      Wt Readings from Last 1 Encounters:   02/12/20 65.8 kg (145 lb)    Estimated body mass index is 24.89 kg/m  as calculated from the following:    Height as of 1/6/20: 1.626 m (5' 4\").    Weight as of 2/12/20: 65.8 kg (145 lb).       Anesthesia Plan      History & Physical Review      ASA Status:  2 .    NPO Status:  > 6 hours    Plan for MAC with Propofol induction.            Postoperative Care      Consents  Anesthetic plan, risks, benefits and alternatives discussed with:  Patient..                 GOVIND POPE APRN CRNA  "

## 2020-08-18 ENCOUNTER — MYC REFILL (OUTPATIENT)
Dept: PEDIATRICS | Facility: OTHER | Age: 55
End: 2020-08-18

## 2020-08-18 DIAGNOSIS — N40.0 BENIGN PROSTATIC HYPERPLASIA WITHOUT LOWER URINARY TRACT SYMPTOMS: ICD-10-CM

## 2020-08-18 RX ORDER — TAMSULOSIN HYDROCHLORIDE 0.4 MG/1
0.4 CAPSULE ORAL DAILY
Qty: 30 CAPSULE | Refills: 1 | Status: CANCELLED | OUTPATIENT
Start: 2020-08-18

## 2020-08-18 NOTE — RESULT ENCOUNTER NOTE
Jessenia/Carol/Ivon/Rakel-Please call patient and let them know the colon polyp that was removed was fine No risk of cancer. Follow up colonoscopy is recommended in 5 years to check for new polyps. High fiber diet is recommended for colon health. Patient should call with questions or concerns. Thanks!

## 2020-08-19 ENCOUNTER — TELEPHONE (OUTPATIENT)
Dept: PEDIATRICS | Facility: OTHER | Age: 55
End: 2020-08-19

## 2020-08-19 DIAGNOSIS — N40.0 BENIGN PROSTATIC HYPERPLASIA WITHOUT LOWER URINARY TRACT SYMPTOMS: ICD-10-CM

## 2020-08-19 RX ORDER — TAMSULOSIN HYDROCHLORIDE 0.4 MG/1
0.4 CAPSULE ORAL DAILY
Qty: 90 CAPSULE | Refills: 1 | Status: SHIPPED | OUTPATIENT
Start: 2020-08-19 | End: 2021-02-22

## 2020-08-19 NOTE — TELEPHONE ENCOUNTER
"Requested Prescriptions   Pending Prescriptions Disp Refills     tamsulosin (FLOMAX) 0.4 MG capsule [Pharmacy Med Name: tamsulosin 0.4 mg capsule] 30 capsule 1     Sig: Take 1 capsule (0.4 mg) by mouth daily After a meal       Alpha Blockers Passed - 8/19/2020 11:55 AM        Passed - Blood pressure under 140/90 in past 12 months     BP Readings from Last 3 Encounters:   08/12/20 123/84   02/12/20 118/76   02/04/20 112/64                 Passed - Recent (12 mo) or future (30 days) visit within the authorizing provider's specialty     Patient has had an office visit with the authorizing provider or a provider within the authorizing providers department within the previous 12 mos or has a future within next 30 days. See \"Patient Info\" tab in inbasket, or \"Choose Columns\" in Meds & Orders section of the refill encounter.              Passed - Patient does not have Tadalafil, Vardenafil, or Sildenafil on their medication list        Passed - Medication is active on med list        Passed - Patient is 18 years of age or older         LOV 1/6/20 Dl  Prescription approved per [facility] Refill Protocol.  Brenda J. Goodell, RN on 8/19/2020 at 2:14 PM                  "

## 2020-08-20 NOTE — TELEPHONE ENCOUNTER
Patient name and date of birth verified. After verification, patient was informed of colonoscopy results from Dr. Cardona.     Naomi Chung LPN 8/20/2020 8:44 AM

## 2020-08-20 NOTE — TELEPHONE ENCOUNTER
flomax refilled on 8/19/2020 #90 x 1 reffill to [facility] pharmacy   Guerita Fleming RN on 8/20/2020 at 2:48 PM

## 2020-12-22 DIAGNOSIS — R97.20 ELEVATED PROSTATE SPECIFIC ANTIGEN (PSA): Primary | ICD-10-CM

## 2020-12-22 NOTE — PROGRESS NOTES
"Per last office visit  2/12/20 with Daniel Costa MD \"Plan  Continue Flomax daily  Follow-up annually with a PSA\"        Orders Placed    "

## 2020-12-27 ENCOUNTER — HEALTH MAINTENANCE LETTER (OUTPATIENT)
Age: 55
End: 2020-12-27

## 2021-02-11 ENCOUNTER — OFFICE VISIT (OUTPATIENT)
Dept: UROLOGY | Facility: OTHER | Age: 56
End: 2021-02-11
Attending: UROLOGY
Payer: COMMERCIAL

## 2021-02-11 VITALS
SYSTOLIC BLOOD PRESSURE: 114 MMHG | BODY MASS INDEX: 25.92 KG/M2 | WEIGHT: 151 LBS | HEART RATE: 92 BPM | DIASTOLIC BLOOD PRESSURE: 72 MMHG | RESPIRATION RATE: 16 BRPM

## 2021-02-11 DIAGNOSIS — R97.20 ELEVATED PROSTATE SPECIFIC ANTIGEN (PSA): Primary | ICD-10-CM

## 2021-02-11 DIAGNOSIS — R35.0 BENIGN PROSTATIC HYPERPLASIA WITH URINARY FREQUENCY: ICD-10-CM

## 2021-02-11 DIAGNOSIS — N40.1 BENIGN PROSTATIC HYPERPLASIA WITH URINARY FREQUENCY: ICD-10-CM

## 2021-02-11 DIAGNOSIS — R97.20 ELEVATED PROSTATE SPECIFIC ANTIGEN (PSA): ICD-10-CM

## 2021-02-11 LAB — PSA SERPL-MCNC: 5.46 NG/ML

## 2021-02-11 PROCEDURE — 84153 ASSAY OF PSA TOTAL: CPT | Mod: ZL | Performed by: UROLOGY

## 2021-02-11 PROCEDURE — 36415 COLL VENOUS BLD VENIPUNCTURE: CPT | Mod: ZL | Performed by: UROLOGY

## 2021-02-11 PROCEDURE — 99214 OFFICE O/P EST MOD 30 MIN: CPT | Performed by: UROLOGY

## 2021-02-11 ASSESSMENT — PAIN SCALES - GENERAL: PAINLEVEL: NO PAIN (0)

## 2021-02-11 NOTE — NURSING NOTE
Review of Systems:    Weight loss:    No     Recent fever/chills:  No   Night sweats:   No  Current skin rash:  No   Recent hair loss:  No  Heat intolerance:  No   Cold intolerance:  No  Chest pain:   Yes   Palpitations:   No  Shortness of breath:  No   Wheezing:   No  Constipation:    No   Diarrhea:   No   Nausea:   No   Vomiting:   No   Kidney/side pain:  No   Back pain:   No  Frequent headaches:  No   Dizziness:     No  Leg swelling:   No   Calf pain:    No

## 2021-02-11 NOTE — PROGRESS NOTES
Type of Visit  Established    Chief Complaint  Elevated PSA  BPH with urinary frequency    HPI  Mr. Chavarria is a 55 year old male with a history of negative TRUS follows up with a known elevated PSA.  She also has BPH with urinary frequency and weak stream.  The patient has no known family history of prostate cancer.  His father did have BPH but he doesn't think he had cancer although there is some confusion/debate among family members.  Patient reports no changes in medical history in the past year.  It sounds like his father underwent multiple TURPs and his brother just underwent a TURP in addition to the fact that the patient's measured prostate size was 54 g at the time of biopsy.  The patient currently takes Flomax for BPH.  He does not take finasteride.  Sexual activity is still a priority for him.  He denies side effects with Flomax.      Review of Systems  I reviewed the ROS with the patient.    Nursing Notes:   Benedicto Keita RN  2/11/2021 11:45 AM  Signed  Review of Systems:    Weight loss:    No     Recent fever/chills:  No   Night sweats:   No  Current skin rash:  No   Recent hair loss:  No  Heat intolerance:  No   Cold intolerance:  No  Chest pain:   Yes   Palpitations:   No  Shortness of breath:  No   Wheezing:   No  Constipation:    No   Diarrhea:   No   Nausea:   No   Vomiting:   No   Kidney/side pain:  No   Back pain:   No  Frequent headaches:  No   Dizziness:     No  Leg swelling:   No   Calf pain:    No            Family History  Family History   Problem Relation Age of Onset     Other - See Comments Father         BPH     Heart Disease Father 85        Heart Disease,AMI     Dementia Mother         Dementia     Arthritis Mother         Arthritis     Other - See Comments Brother         enlarged prostate     Other - See Comments Brother         x3 with BPH     Diabetes Brother         Diabetes     Prostate Cancer No family hx of         Cancer-prostate     Colon Cancer No family hx of          Cancer-colon       Physical Exam  Vitals:    02/11/21 1142   BP: 114/72   BP Location: Right arm   Patient Position: Sitting   Cuff Size: Adult Regular   Pulse: 92   Resp: 16   Weight: 68.5 kg (151 lb)     Constitutional: NAD, WDWN.  Cardiovascular: Regular rate.  Pulmonary/Chest: Respirations are even and non-labored bilaterally.  Abdominal: Soft. No distension, tenderness, masses or guarding. No CVA tenderness.  Extremities: LAURO x 4, Warm. No clubbing.  No cyanosis.    Skin: Pink, warm and dry.  No rashes noted.    Labs  Results for KIM LIN (MRN [id number]) as of 2/11/2021 11:36   2/11/2021 09:39   PSA 5.465 (H)     Results for KIM LIN (MRN [id number]) as of 1/15/2020 14:58   7/12/2018 09:46 1/6/2020 15:18   PSA 4.208 (H) 5.439 (H)     Results for KIM LIN (MRN [id number]) as of 1/15/2020 14:58   6/23/2017 15:26   PSA 4.512 (H)     Results for KIM LIN (MRN [id number]) as of 1/15/2020 14:58   7/14/2015 13:10   PSA 3.798 (H)     Pathology  2/4/2020  Negative 12 cores out of 12 cores  Calculated prostate volume based on TRUS: 47 grams    Assessment  Mr. Lin is a 55 year old male w/ho negative TRUS who follows up with an elevated PSA  He has a is completely stable and favorable PSA density.  We discussed maximal medical therapy with Flomax and finasteride for BPH.  Given the sexual side effects we will avoid finasteride for now.    Plan  Continue Flomax daily  Follow-up annually with a PSA and PVR

## 2021-02-18 DIAGNOSIS — N40.0 BENIGN PROSTATIC HYPERPLASIA WITHOUT LOWER URINARY TRACT SYMPTOMS: ICD-10-CM

## 2021-02-22 RX ORDER — TAMSULOSIN HYDROCHLORIDE 0.4 MG/1
0.4 CAPSULE ORAL DAILY
Qty: 90 CAPSULE | Refills: 3 | Status: SHIPPED | OUTPATIENT
Start: 2021-02-22 | End: 2022-02-14

## 2021-02-22 NOTE — TELEPHONE ENCOUNTER
"Pt.'s last office visit with Daniel Costa MD was on 2/11/2021 and note states:  \"Plan  Continue Flomax daily  Follow-up annually with a PSA and PVR\"  To MD to address.  Keira Badillo RN.........2/22/2021...8:41 AM   "

## 2021-03-06 ENCOUNTER — HEALTH MAINTENANCE LETTER (OUTPATIENT)
Age: 56
End: 2021-03-06

## 2021-03-11 DIAGNOSIS — E78.2 MIXED HYPERLIPIDEMIA: ICD-10-CM

## 2021-03-11 RX ORDER — ASPIRIN 81 MG/1
TABLET, COATED ORAL
Qty: 90 TABLET | Refills: 3 | Status: SHIPPED | OUTPATIENT
Start: 2021-03-11 | End: 2022-03-22

## 2021-03-11 NOTE — TELEPHONE ENCOUNTER
Prescription refilled per RN Medication Refill Policy.................... Carol Ling RN ....................  3/11/2021   4:44 PM       Analgesics (Non-Narcotic Tylenol and ASA Only) Passed

## 2021-03-15 ENCOUNTER — OFFICE VISIT (OUTPATIENT)
Dept: PEDIATRICS | Facility: OTHER | Age: 56
End: 2021-03-15
Attending: INTERNAL MEDICINE
Payer: COMMERCIAL

## 2021-03-15 VITALS
HEIGHT: 64 IN | OXYGEN SATURATION: 98 % | WEIGHT: 148 LBS | RESPIRATION RATE: 16 BRPM | SYSTOLIC BLOOD PRESSURE: 132 MMHG | TEMPERATURE: 96.7 F | HEART RATE: 95 BPM | DIASTOLIC BLOOD PRESSURE: 82 MMHG | BODY MASS INDEX: 25.27 KG/M2

## 2021-03-15 DIAGNOSIS — Z11.4 ENCOUNTER FOR SCREENING FOR HIV: ICD-10-CM

## 2021-03-15 DIAGNOSIS — R73.03 PRE-DIABETES: ICD-10-CM

## 2021-03-15 DIAGNOSIS — Z87.891 PERSONAL HISTORY OF TOBACCO USE: ICD-10-CM

## 2021-03-15 DIAGNOSIS — E78.2 MIXED HYPERLIPIDEMIA: ICD-10-CM

## 2021-03-15 DIAGNOSIS — K21.00 GASTROESOPHAGEAL REFLUX DISEASE WITH ESOPHAGITIS, UNSPECIFIED WHETHER HEMORRHAGE: ICD-10-CM

## 2021-03-15 DIAGNOSIS — Z00.00 ANNUAL PHYSICAL EXAM: Primary | ICD-10-CM

## 2021-03-15 DIAGNOSIS — Z91.89 10 YEAR RISK OF MI OR STROKE 7.5% OR GREATER: ICD-10-CM

## 2021-03-15 DIAGNOSIS — F17.200 TOBACCO USE DISORDER: ICD-10-CM

## 2021-03-15 DIAGNOSIS — Z11.59 NEED FOR HEPATITIS C SCREENING TEST: ICD-10-CM

## 2021-03-15 DIAGNOSIS — R09.89 BRUIT OF RIGHT CAROTID ARTERY: ICD-10-CM

## 2021-03-15 LAB
ANION GAP SERPL CALCULATED.3IONS-SCNC: 6 MMOL/L (ref 3–14)
BUN SERPL-MCNC: 12 MG/DL (ref 7–25)
CALCIUM SERPL-MCNC: 9.2 MG/DL (ref 8.6–10.3)
CHLORIDE SERPL-SCNC: 103 MMOL/L (ref 98–107)
CHOLEST SERPL-MCNC: 188 MG/DL
CO2 SERPL-SCNC: 27 MMOL/L (ref 21–31)
CREAT SERPL-MCNC: 0.96 MG/DL (ref 0.7–1.3)
GFR SERPL CREATININE-BSD FRML MDRD: 81 ML/MIN/{1.73_M2}
GLUCOSE SERPL-MCNC: 109 MG/DL (ref 70–105)
HBA1C MFR BLD: 5.8 % (ref 4–6)
HDLC SERPL-MCNC: 50 MG/DL (ref 23–92)
LDLC SERPL CALC-MCNC: 124 MG/DL
NONHDLC SERPL-MCNC: 138 MG/DL
POTASSIUM SERPL-SCNC: 4.1 MMOL/L (ref 3.5–5.1)
SODIUM SERPL-SCNC: 136 MMOL/L (ref 134–144)
TRIGL SERPL-MCNC: 70 MG/DL

## 2021-03-15 PROCEDURE — 87389 HIV-1 AG W/HIV-1&-2 AB AG IA: CPT | Mod: ZL | Performed by: INTERNAL MEDICINE

## 2021-03-15 PROCEDURE — 86803 HEPATITIS C AB TEST: CPT | Mod: ZL | Performed by: INTERNAL MEDICINE

## 2021-03-15 PROCEDURE — 36415 COLL VENOUS BLD VENIPUNCTURE: CPT | Mod: ZL | Performed by: INTERNAL MEDICINE

## 2021-03-15 PROCEDURE — G0296 VISIT TO DETERM LDCT ELIG: HCPCS | Performed by: INTERNAL MEDICINE

## 2021-03-15 PROCEDURE — 90471 IMMUNIZATION ADMIN: CPT | Performed by: INTERNAL MEDICINE

## 2021-03-15 PROCEDURE — 80048 BASIC METABOLIC PNL TOTAL CA: CPT | Mod: ZL | Performed by: INTERNAL MEDICINE

## 2021-03-15 PROCEDURE — 80061 LIPID PANEL: CPT | Mod: ZL | Performed by: INTERNAL MEDICINE

## 2021-03-15 PROCEDURE — 90686 IIV4 VACC NO PRSV 0.5 ML IM: CPT | Performed by: INTERNAL MEDICINE

## 2021-03-15 PROCEDURE — 83036 HEMOGLOBIN GLYCOSYLATED A1C: CPT | Mod: ZL | Performed by: INTERNAL MEDICINE

## 2021-03-15 PROCEDURE — 99396 PREV VISIT EST AGE 40-64: CPT | Mod: 25 | Performed by: INTERNAL MEDICINE

## 2021-03-15 RX ORDER — FAMOTIDINE 10 MG
10 TABLET ORAL 2 TIMES DAILY
Qty: 180 TABLET | Refills: 4 | Status: SHIPPED | OUTPATIENT
Start: 2021-03-15 | End: 2022-08-25

## 2021-03-15 RX ORDER — ATORVASTATIN CALCIUM 10 MG/1
10 TABLET, FILM COATED ORAL DAILY
Qty: 90 TABLET | Refills: 3 | Status: SHIPPED | OUTPATIENT
Start: 2021-03-15 | End: 2022-03-22

## 2021-03-15 ASSESSMENT — PAIN SCALES - GENERAL: PAINLEVEL: NO PAIN (0)

## 2021-03-15 ASSESSMENT — MIFFLIN-ST. JEOR: SCORE: 1417.32

## 2021-03-15 NOTE — PATIENT INSTRUCTIONS
-- Start atorvastatin   -- You should quit smoking   -- Schedule carotid ultrasound   -- Schedule lung cancer screen CT   -- Get the new shingles vaccine series from a nurse-only visit, pharmacy or [facility] ([facility] RN).      The 10-year ASCVD risk score (Kassandra SLOAN Jr., et al., 2013) is: 9.5%    Values used to calculate the score:      Age: 55 years      Sex: Male      Is Non- : No      Diabetic: No      Tobacco smoker: Yes      Systolic Blood Pressure: 132 mmHg      Is BP treated: No      HDL Cholesterol: 53 mg/dL      Total Cholesterol: 185 mg/dL    Lung Cancer Screening   Frequently Asked Questions  If you are at high-risk for lung cancer, getting screened with low-dose computed tomography (LDCT) every year can help save your life. This handout offers answers to some of the most common questions about lung cancer screening. If you have other questions, please call [phone] ([phone]).     What is it?  Lung cancer screening uses special X-ray technology to create an image of your lung tissue. The exam is quick and easy and takes less than 10 seconds. We don t give you any medicine or use any needles. You can eat before and after the exam. You don t need to change your clothes as long as the clothing on your chest doesn t contain metal. But, you do need to be able to hold your breath for at least 6 seconds during the exam.    What is the goal of lung cancer screening?  The goal of lung cancer screening is to save lives. Many times, lung cancer is not found until a person starts having physical symptoms. Lung cancer screening can help detect lung cancer in the earliest stages when it may be easier to treat.    Who should be screened for lung cancer?  We suggest lung cancer screening for anyone who is at high-risk for lung cancer. You are in the high-risk group if you:      are between the ages of 55 and 79, and    have smoked at least 1 pack of cigarettes a day for  30 or more years, and    still smoke or have quit within the past 15 years.    However, if you have a new cough or shortness of breath, you should talk to your doctor before being screened.    Some national lung health advocacy groups also recommend screening for people ages 50 to 79 who have smoked an average of 1 pack of cigarettes a day for 20 years. They must also have at least 1 other risk factor for lung cancer, not including exposure to secondhand smoke. Other risk factors are having had cancer in the past, emphysema, pulmonary fibrosis, COPD, a family history of lung cancer, or exposure to certain materials such as arsenic, asbestos, beryllium, cadmium, chromium, diesel fumes, nickel, radon or silica. Your care team can help you know if you have one of these risk factors.     Why does it matter if I have symptoms?  Certain symptoms can be a sign that you have a condition in your lungs that should be checked and treated by your doctor. These symptoms include fever, chest pain, a new or changing cough, shortness of breath that you have never felt before, coughing up blood or unexplained weight loss. Having any of these symptoms can greatly affect the results of lung cancer screening.       Should all smokers get an LDCT lung cancer screening exam?  It depends. Lung cancer screening is for a very specific group of men and women who have a history of heavy smoking over a long period of time (see  Who should be screened for lung cancer  above).  I am in the high-risk group, but have been diagnosed with cancer in the past. Is LDCT lung cancer screening right for me?  In some cases, you should not have LDCT lung screening, such as when your doctor is already following your cancer with CT scan studies. Your doctor will help you decide if LDCT lung screening is right for you.  Do I need to have a screening exam every year?  Yes. If you are in the high-risk group described earlier, you should get an LDCT lung cancer  screening exam every year until you are 79, or are no longer willing or able to undergo screening and possible procedures to diagnose and treat lung cancer.  How effective is LDCT at preventing death from lung cancer?  Studies have shown that LDCT lung cancer screening can lower the risk of death from lung cancer by 20 percent in people who are at high-risk.  What are the risks?  There are some risks and limitations of LDCT lung cancer screening. We want to make sure you understand the risks and benefits, so please let us know if you have any questions. Your doctor may want to talk with you more about these risks.    Radiation exposure: As with any exam that uses radiation, there is a very small increased risk of cancer. The amount of radiation in LDCT is small--about the same amount a person would get from a mammogram. Your doctor orders the exam when he or she feels the potential benefits outweigh the risks.    False negatives: No test is perfect, including LDCT. It is possible that you may have a medical condition, including lung cancer, that is not found during your exam. This is called a false negative result.    False positives and more testing: LDCT very often finds something in the lung that could be cancer, but in fact is not. This is called a false positive result. False positive tests often cause anxiety. To make sure these findings are not cancer, you may need to have more tests. These tests will be done only if you give us permission. Sometimes patients need a treatment that can have side effects, such as a biopsy. For more information on false positives, see  What can I expect from the results?     Findings not related to lung cancer: Your LDCT exam also takes pictures of areas of your body next to your lungs. In a very small number of cases, the CT scan will show an abnormal finding in one of these areas, such as your kidneys, adrenal glands, liver or thyroid. This finding may not be serious, but you  may need more tests. Your doctor can help you decide what other tests you may need, if any.  What can I expect from the results?  About 1 out of 4 LDCT exams will find something that may need more tests. Most of the time, these findings are lung nodules. Lung nodules are very small collections of tissue in the lung. These nodules are very common, and the vast majority--more than 97 percent--are not cancer (benign). Most are normal lymph nodes or small areas of scarring from past infections.  But, if a small lung nodule is found to be cancer, the cancer can be cured more than 90 percent of the time. To know if the nodule is cancer, we may need to get more images before your next yearly screening exam. If the nodule has suspicious features (for example, it is large, has an odd shape or grows over time), we will refer you to a specialist for further testing.  Will my doctor also get the results?  Yes. Your doctor will get a copy of your results.  Is it okay to keep smoking now that there s a cancer screening exam?  No. Tobacco is one of the strongest cancer-causing agents. It causes not only lung cancer, but other cancers and cardiovascular (heart) diseases as well. The damage caused by smoking builds over time. This means that the longer you smoke, the higher your risk of disease. While it is never too late to quit, the sooner you quit, the better.  Where can I find help to quit smoking?  The best way to prevent lung cancer is to stop smoking. If you have already quit smoking, congratulations and keep it up! For help on quitting smoking, please call [employer] at 8-639-851-QTFD ([phone]) or the American [employer] Society at 1-805.705.7732 to find local resources near you.  One-on-one health coaching:  If you d prefer to work individually with a health care provider on tobacco cessation, we offer:      Medication Therapy Management:  Our specially trained pharmacists work closely with you and your doctor to help you quit  smoking.  Call [phone] or [phone] (toll free).     [employer]: Health coaching offered by [facility].  [email or web address]

## 2021-03-15 NOTE — NURSING NOTE
"Chief Complaint   Patient presents with     Physical     Patient presents for annual physical     Initial /82 (BP Location: Right arm, Patient Position: Sitting, Cuff Size: Adult Regular)   Pulse 95   Temp 96.7  F (35.9  C) (Tympanic)   Resp 16   Ht 1.626 m (5' 4\")   Wt 67.1 kg (148 lb)   SpO2 98%   BMI 25.40 kg/m   Estimated body mass index is 25.4 kg/m  as calculated from the following:    Height as of this encounter: 1.626 m (5' 4\").    Weight as of this encounter: 67.1 kg (148 lb).  Medication Reconciliation: complete    Rose Mary Haynes MA     Immunization Documentation    Prior to Immunization administration, verified patients identity using patient's name and date of birth. Please see IMMUNIZATIONS  and order for additional information.  Patient / Parent instructed to remain in clinic for 15 minutes and report any adverse reaction to staff immediately.    Was the entire amount of vaccines given used? Yes    Rose Mary Haynes MA  3/15/2021   8:30 AM    "

## 2021-03-15 NOTE — PROGRESS NOTES
Lung Cancer Screening Shared Decision Making Visit     Marvin Chavarria is eligible for lung cancer screening on the basis of the information provided in my signed lung cancer screening order.     I have discussed with patient the risks and benefits of screening for lung cancer with low-dose CT.     The risks include:  radiation exposure: one low dose chest CT has as much ionizing radiation as about 15 chest x-rays or 6 months of background radiation living in [place]    false positives: 96% of positive findings/nodules are NOT cancer, but some might still require additional diagnostic evaluation, including biopsy  over-diagnosis: some slow growing cancers that might never have been clinically significant will be detected and treated unnecessarily     The benefit of early detection of lung cancer is contingent upon adherence to annual screening or more frequent follow up if indicated.     Furthermore, reaping the benefits of screening requires Marvin Chavarria to be willing and physically able to undergo diagnostic procedures, if indicated. Although no specific guide is available for determining severity of comorbidities, it is reasonable to withhold screening in patients who have greater mortality risk from other diseases.     We did discuss that the only way to prevent lung cancer is to not smoke. Smoking cessation counseling was given, duration < 3 minutes.      I did not offer risk estimation using a calculator such as this one:    ShouldIScreen

## 2021-03-15 NOTE — PROGRESS NOTES
"Subjective   Marvin Chavarria is a 55 year old male who presents for annual physical.  He has a red area in front of his right ear he wants me to look at.  His wife told him he had to get it checked out.  He has had a few episodes of chest pain which come fleetingly and he thinks are connected to stress.  No exertion symptoms.    Objective   Vitals: /82 (BP Location: Right arm, Patient Position: Sitting, Cuff Size: Adult Regular)   Pulse 95   Temp 96.7  F (35.9  C) (Tympanic)   Resp 16   Ht 1.626 m (5' 4\")   Wt 67.1 kg (148 lb)   SpO2 98%   BMI 25.40 kg/m      General: well appearing  Neck: Right-sided carotid bruit is present.  CV: Regular rate and rhythm, no murmur, rub or gallop  Pulm: Clear to auscultation bilaterally, no wheezing, rales or rhonchi  Neuro: Grossly intact  Musculoskeletal: No lower extremity edema  Skin: No rash  Psychiatry: Normal affect and insight.    Review and Analysis of Data   I personally reviewed the following:  External notes: No  Results: Yes  Use of an independent historian: No  Independent review of a test performed by another physician: No  Discussion of management with another physician: No  Moderate risk of morbidity from additional diagnostic testing and/or treatment.    Assessment & Plan   1. Annual physical exam      2. Mixed hyperlipidemia  We had a lengthy discussion with regards to the use of statins in stroke and heart risk reduction.  We plan to start atorvastatin.    - Lipid Profile; Future  - Basic Metabolic Panel; Future  - Basic Metabolic Panel  - Lipid Profile    3. Pre-diabetes  - Hemoglobin A1c; Future  - Basic Metabolic Panel; Future  - Basic Metabolic Panel  - Hemoglobin A1c    4. Encounter for screening for HIV  - HIV Antigen Antibody Combo; Future  - HIV Antigen Antibody Combo    5. Need for hepatitis C screening test  - Hepatitis C Screen Reflex to HCV RNA Quant and Genotype; Future  - Hepatitis C Screen Reflex to HCV RNA Quant and Genotype    6. 10 " year risk of MI or stroke 7.5% or greater  See above  - atorvastatin (LIPITOR) 10 MG tablet; Take 1 tablet (10 mg) by mouth daily  Dispense: 90 tablet; Refill: 3    7. Gastroesophageal reflux disease with esophagitis, unspecified whether hemorrhage  At goal, no change.  - famotidine (PEPCID) 10 MG tablet; Take 1 tablet (10 mg) by mouth 2 times daily  Dispense: 180 tablet; Refill: 4    8. Bruit of right carotid artery  - US Carotid Bilateral; Future    9. Personal history of tobacco use  - Prof fee: Shared Decisionmaking for Lung Cancer Screening  - CT Chest Lung Cancer Scrn Low Dose wo; Future    10. Tobacco use disorder      Patient Instructions    -- Start atorvastatin   -- You should quit smoking   -- Schedule carotid ultrasound   -- Schedule lung cancer screen CT   -- Get the new shingles vaccine series from a nurse-only visit, pharmacy or [facility] ([facility] RN).      The 10-year ASCVD risk score (Kassandra SLOAN Jr., et al., 2013) is: 9.5%    Values used to calculate the score:      Age: 55 years      Sex: Male      Is Non- : No      Diabetic: No      Tobacco smoker: Yes      Systolic Blood Pressure: 132 mmHg      Is BP treated: No      HDL Cholesterol: 53 mg/dL      Total Cholesterol: 185 mg/dL    Lung Cancer Screening   Frequently Asked Questions  If you are at high-risk for lung cancer, getting screened with low-dose computed tomography (LDCT) every year can help save your life. This handout offers answers to some of the most common questions about lung cancer screening. If you have other questions, please call [phone] ([phone]).     What is it?  Lung cancer screening uses special X-ray technology to create an image of your lung tissue. The exam is quick and easy and takes less than 10 seconds. We don t give you any medicine or use any needles. You can eat before and after the exam. You don t need to change your clothes as long as the clothing on your chest  doesn t contain metal. But, you do need to be able to hold your breath for at least 6 seconds during the exam.    What is the goal of lung cancer screening?  The goal of lung cancer screening is to save lives. Many times, lung cancer is not found until a person starts having physical symptoms. Lung cancer screening can help detect lung cancer in the earliest stages when it may be easier to treat.    Who should be screened for lung cancer?  We suggest lung cancer screening for anyone who is at high-risk for lung cancer. You are in the high-risk group if you:      are between the ages of 55 and 79, and    have smoked at least 1 pack of cigarettes a day for 30 or more years, and    still smoke or have quit within the past 15 years.    However, if you have a new cough or shortness of breath, you should talk to your doctor before being screened.    Some national lung health advocacy groups also recommend screening for people ages 50 to 79 who have smoked an average of 1 pack of cigarettes a day for 20 years. They must also have at least 1 other risk factor for lung cancer, not including exposure to secondhand smoke. Other risk factors are having had cancer in the past, emphysema, pulmonary fibrosis, COPD, a family history of lung cancer, or exposure to certain materials such as arsenic, asbestos, beryllium, cadmium, chromium, diesel fumes, nickel, radon or silica. Your care team can help you know if you have one of these risk factors.     Why does it matter if I have symptoms?  Certain symptoms can be a sign that you have a condition in your lungs that should be checked and treated by your doctor. These symptoms include fever, chest pain, a new or changing cough, shortness of breath that you have never felt before, coughing up blood or unexplained weight loss. Having any of these symptoms can greatly affect the results of lung cancer screening.       Should all smokers get an LDCT lung cancer screening exam?  It depends.  Lung cancer screening is for a very specific group of men and women who have a history of heavy smoking over a long period of time (see  Who should be screened for lung cancer  above).  I am in the high-risk group, but have been diagnosed with cancer in the past. Is LDCT lung cancer screening right for me?  In some cases, you should not have LDCT lung screening, such as when your doctor is already following your cancer with CT scan studies. Your doctor will help you decide if LDCT lung screening is right for you.  Do I need to have a screening exam every year?  Yes. If you are in the high-risk group described earlier, you should get an LDCT lung cancer screening exam every year until you are 79, or are no longer willing or able to undergo screening and possible procedures to diagnose and treat lung cancer.  How effective is LDCT at preventing death from lung cancer?  Studies have shown that LDCT lung cancer screening can lower the risk of death from lung cancer by 20 percent in people who are at high-risk.  What are the risks?  There are some risks and limitations of LDCT lung cancer screening. We want to make sure you understand the risks and benefits, so please let us know if you have any questions. Your doctor may want to talk with you more about these risks.    Radiation exposure: As with any exam that uses radiation, there is a very small increased risk of cancer. The amount of radiation in LDCT is small--about the same amount a person would get from a mammogram. Your doctor orders the exam when he or she feels the potential benefits outweigh the risks.    False negatives: No test is perfect, including LDCT. It is possible that you may have a medical condition, including lung cancer, that is not found during your exam. This is called a false negative result.    False positives and more testing: LDCT very often finds something in the lung that could be cancer, but in fact is not. This is called a false positive  result. False positive tests often cause anxiety. To make sure these findings are not cancer, you may need to have more tests. These tests will be done only if you give us permission. Sometimes patients need a treatment that can have side effects, such as a biopsy. For more information on false positives, see  What can I expect from the results?     Findings not related to lung cancer: Your LDCT exam also takes pictures of areas of your body next to your lungs. In a very small number of cases, the CT scan will show an abnormal finding in one of these areas, such as your kidneys, adrenal glands, liver or thyroid. This finding may not be serious, but you may need more tests. Your doctor can help you decide what other tests you may need, if any.  What can I expect from the results?  About 1 out of 4 LDCT exams will find something that may need more tests. Most of the time, these findings are lung nodules. Lung nodules are very small collections of tissue in the lung. These nodules are very common, and the vast majority--more than 97 percent--are not cancer (benign). Most are normal lymph nodes or small areas of scarring from past infections.  But, if a small lung nodule is found to be cancer, the cancer can be cured more than 90 percent of the time. To know if the nodule is cancer, we may need to get more images before your next yearly screening exam. If the nodule has suspicious features (for example, it is large, has an odd shape or grows over time), we will refer you to a specialist for further testing.  Will my doctor also get the results?  Yes. Your doctor will get a copy of your results.  Is it okay to keep smoking now that there s a cancer screening exam?  No. Tobacco is one of the strongest cancer-causing agents. It causes not only lung cancer, but other cancers and cardiovascular (heart) diseases as well. The damage caused by smoking builds over time. This means that the longer you smoke, the higher your risk  of disease. While it is never too late to quit, the sooner you quit, the better.  Where can I find help to quit smoking?  The best way to prevent lung cancer is to stop smoking. If you have already quit smoking, congratulations and keep it up! For help on quitting smoking, please call [employer] at 6-881-055-SKUC ([phone] or the American Cancer Society at 1-310.466.6369 to find local resources near you.  One-on-one health coaching:  If you d prefer to work individually with a health care provider on tobacco cessation, we offer:      Medication Therapy Management:  Our specially trained pharmacists work closely with you and your doctor to help you quit smoking.  Call [phone] or [phone] (toll free).     Can Do: Health coaching offered by [employer] [facility].  [email or web address]        Return in about 1 year (around 3/15/2022) for med management.    Lacho Payne MD, FAAP, FACP  Internal Medicine & Pediatrics

## 2021-03-16 LAB
HCV AB SERPL QL IA: NONREACTIVE
HIV 1+2 AB+HIV1 P24 AG SERPL QL IA: NONREACTIVE

## 2021-03-25 ENCOUNTER — HOSPITAL ENCOUNTER (OUTPATIENT)
Dept: CT IMAGING | Facility: OTHER | Age: 56
End: 2021-03-25
Attending: INTERNAL MEDICINE
Payer: COMMERCIAL

## 2021-03-25 ENCOUNTER — HOSPITAL ENCOUNTER (OUTPATIENT)
Dept: ULTRASOUND IMAGING | Facility: OTHER | Age: 56
End: 2021-03-25
Attending: INTERNAL MEDICINE
Payer: COMMERCIAL

## 2021-03-25 DIAGNOSIS — Z87.891 PERSONAL HISTORY OF TOBACCO USE: ICD-10-CM

## 2021-03-25 DIAGNOSIS — R09.89 BRUIT OF RIGHT CAROTID ARTERY: ICD-10-CM

## 2021-03-25 PROCEDURE — 93880 EXTRACRANIAL BILAT STUDY: CPT

## 2021-03-25 PROCEDURE — 71271 CT THORAX LUNG CANCER SCR C-: CPT

## 2021-09-08 ENCOUNTER — OFFICE VISIT (OUTPATIENT)
Dept: UROLOGY | Facility: OTHER | Age: 56
End: 2021-09-08
Attending: UROLOGY
Payer: COMMERCIAL

## 2021-09-08 VITALS
RESPIRATION RATE: 16 BRPM | OXYGEN SATURATION: 96 % | SYSTOLIC BLOOD PRESSURE: 110 MMHG | HEART RATE: 63 BPM | BODY MASS INDEX: 25.37 KG/M2 | DIASTOLIC BLOOD PRESSURE: 70 MMHG | WEIGHT: 147.8 LBS

## 2021-09-08 DIAGNOSIS — R31.0 GROSS HEMATURIA: Primary | ICD-10-CM

## 2021-09-08 DIAGNOSIS — R97.20 ELEVATED PROSTATE SPECIFIC ANTIGEN (PSA): ICD-10-CM

## 2021-09-08 LAB
ALBUMIN UR-MCNC: NEGATIVE MG/DL
APPEARANCE UR: CLEAR
BILIRUB UR QL STRIP: NEGATIVE
COLOR UR AUTO: YELLOW
GLUCOSE UR STRIP-MCNC: NEGATIVE MG/DL
HGB UR QL STRIP: NEGATIVE
KETONES UR STRIP-MCNC: NEGATIVE MG/DL
LEUKOCYTE ESTERASE UR QL STRIP: ABNORMAL
MUCOUS THREADS #/AREA URNS LPF: PRESENT /LPF
NITRATE UR QL: NEGATIVE
PH UR STRIP: 5.5 [PH] (ref 5–9)
RBC URINE: 1 /HPF
SP GR UR STRIP: 1.02 (ref 1–1.03)
UROBILINOGEN UR STRIP-MCNC: NORMAL MG/DL
WBC URINE: 3 /HPF

## 2021-09-08 PROCEDURE — 51798 US URINE CAPACITY MEASURE: CPT | Performed by: UROLOGY

## 2021-09-08 PROCEDURE — 81003 URINALYSIS AUTO W/O SCOPE: CPT | Mod: ZL | Performed by: UROLOGY

## 2021-09-08 PROCEDURE — 99214 OFFICE O/P EST MOD 30 MIN: CPT | Mod: 25 | Performed by: UROLOGY

## 2021-09-08 ASSESSMENT — PAIN SCALES - GENERAL: PAINLEVEL: NO PAIN (0)

## 2021-09-08 NOTE — PROGRESS NOTES
Type of Visit  EST    Chief Complaint  Hematuria, gross    HPI  Mr. Chavarria is a 55 year old male with history of elevated PSA who presents with hematuria.  Gross hematuria started 2 weeks ago.  Episode lasted about 1 day and then resolved spontaneously.  Patient denies associated dysuria at the time of onset.  Did notice passing a few clots the day that he noticed the gross hematuria    Hematuria-related signs/symptoms  History of smoking?    Yes - 30 pack year history  History of chemotherapy?   No  History of pelvic radiation?   No  History of kidney or bladder stones?  No  History of frequent urinary tract infections? No      Review of Systems  I personally reviewed the ROS with the patient.    Nursing Notes:   Harriet Ramirez LPN  9/8/2021  9:51 AM  Addendum  Pt presents to clinic for gross hematuria follow up    Review of Systems:    Weight loss:    No     Recent fever/chills:  No   Night sweats:   No  Current skin rash:  No   Recent hair loss:  No  Heat intolerance:  No   Cold intolerance:  No  Chest pain:   No   Palpitations:   No  Shortness of breath:  No   Wheezing:   No  Constipation:    No   Diarrhea:   No   Nausea:   No   Vomiting:   No   Kidney/side pain:  No   Back pain:   No  Frequent headaches:  No   Dizziness:     No  Leg swelling:   No   Calf pain:    No    Post-Void Residual  A post-void residual was measured by ultrasonic bladder scanner.  0 mL  Shazia Hawthorne LPN  9/8/2021 9:56 AM          Physical Exam  Vitals:    09/08/21 0958   BP: 110/70   BP Location: Right arm   Patient Position: Sitting   Cuff Size: Adult Regular   Pulse: 63   Resp: 16   SpO2: 96%   Weight: 67 kg (147 lb 12.8 oz)     Constitutional: No acute distress.  Alert and cooperative   Head: NCAT  Eyes: Conjunctivae normal  Cardiovascular: Regular rate.  Pulmonary/Chest: Respirations are even and non-labored bilaterally, no audible wheezing  Abdominal: Soft. No distension, tenderness, masses or guarding.   Neurological: A + O x  3.  Cranial Nerves II-XII grossly intact.  Extremities: LAURO x 4, Warm. No clubbing.  No cyanosis.    Skin: Pink, warm and dry.  No visible rashes noted.  Psychiatric:  Normal mood and affect  Back:  No left CVA tenderness.  No right CVA tenderness.  Genitourinary:  Nonpalpable bladder    Labs  Results for orders placed or performed in visit on 09/08/21   UA reflex to Microscopic     Status: Abnormal   Result Value Ref Range    Color Urine Yellow Colorless, Straw, Light Yellow, Yellow    Appearance Urine Clear Clear    Glucose Urine Negative Negative mg/dL    Bilirubin Urine Negative Negative    Ketones Urine Negative Negative mg/dL    Specific Gravity Urine 1.024 1.000 - 1.030    Blood Urine Negative Negative    pH Urine 5.5 5.0 - 9.0    Protein Albumin Urine Negative Negative mg/dL    Urobilinogen Urine Normal Normal, 2.0 mg/dL    Nitrite Urine Negative Negative    Leukocyte Esterase Urine Moderate (A) Negative    RBC Urine 1 <=2 /HPF    WBC Urine 3 <=5 /HPF    Mucus Urine Present (A) None Seen /LPF     Results for KIM LIN (MRN [id number]) as of 2/11/2021 11:36   2/11/2021 09:39   PSA 5.465 (H)     Results for KIM LIN (MRN [id number]) as of 1/15/2020 14:58   7/12/2018 09:46 1/6/2020 15:18   PSA 4.208 (H) 5.439 (H)     Results for KIM LIN (MRN [id number]) as of 1/15/2020 14:58   6/23/2017 15:26   PSA 4.512 (H)     Results for KIM LIN (MRN [id number]) as of 1/15/2020 14:58   7/14/2015 13:10   PSA 3.798 (H)     Pathology  2/4/2020  Negative 12 cores out of 12 cores  Calculated prostate volume based on TRUS: 47 grams    Post-Void Residual  A post-void residual was measured by ultrasonic bladder scanner.  0 mL today    Assessment  Mr. Lin is a 55 year old male with sterile gross hematuria    Discussed rationale for work up.  Discussed the specific indications for cross-sectional imaging as well as diagnostic cystoscopy.  Discussed potential findings of hematuria work up including, but  not limited to, kidney stones, bladder tumors and/or kidney tumors.  Also discussed the potential for a normal work up.    Plan  Continue Flomax daily  Follow-up annually with a PSA and PVR  CT Urogram with follow up for cystoscopy

## 2021-09-08 NOTE — NURSING NOTE
Pt presents to clinic for gross hematuria follow up    Review of Systems:    Weight loss:    No     Recent fever/chills:  No   Night sweats:   No  Current skin rash:  No   Recent hair loss:  No  Heat intolerance:  No   Cold intolerance:  No  Chest pain:   No   Palpitations:   No  Shortness of breath:  No   Wheezing:   No  Constipation:    No   Diarrhea:   No   Nausea:   No   Vomiting:   No   Kidney/side pain:  No   Back pain:   No  Frequent headaches:  No   Dizziness:     No  Leg swelling:   No   Calf pain:    No    Post-Void Residual  A post-void residual was measured by ultrasonic bladder scanner.  0 mL  Shazia Hawthorne LPN  9/8/2021 9:56 AM

## 2021-09-15 ENCOUNTER — OFFICE VISIT (OUTPATIENT)
Dept: UROLOGY | Facility: OTHER | Age: 56
End: 2021-09-15
Attending: UROLOGY
Payer: COMMERCIAL

## 2021-09-15 ENCOUNTER — HOSPITAL ENCOUNTER (OUTPATIENT)
Dept: CT IMAGING | Facility: OTHER | Age: 56
End: 2021-09-15
Attending: UROLOGY
Payer: COMMERCIAL

## 2021-09-15 VITALS
RESPIRATION RATE: 16 BRPM | HEART RATE: 75 BPM | OXYGEN SATURATION: 96 % | WEIGHT: 145 LBS | BODY MASS INDEX: 24.89 KG/M2

## 2021-09-15 DIAGNOSIS — N40.1 BENIGN PROSTATIC HYPERPLASIA WITH URINARY FREQUENCY: ICD-10-CM

## 2021-09-15 DIAGNOSIS — R31.0 GROSS HEMATURIA: ICD-10-CM

## 2021-09-15 DIAGNOSIS — R31.0 GROSS HEMATURIA: Primary | ICD-10-CM

## 2021-09-15 DIAGNOSIS — R35.0 BENIGN PROSTATIC HYPERPLASIA WITH URINARY FREQUENCY: ICD-10-CM

## 2021-09-15 PROCEDURE — 250N000011 HC RX IP 250 OP 636: Performed by: UROLOGY

## 2021-09-15 PROCEDURE — 74178 CT ABD&PLV WO CNTR FLWD CNTR: CPT

## 2021-09-15 PROCEDURE — 52000 CYSTOURETHROSCOPY: CPT | Performed by: UROLOGY

## 2021-09-15 RX ORDER — IOPAMIDOL 755 MG/ML
85 INJECTION, SOLUTION INTRAVASCULAR ONCE
Status: COMPLETED | OUTPATIENT
Start: 2021-09-15 | End: 2021-09-15

## 2021-09-15 RX ADMIN — IOPAMIDOL 85 ML: 755 INJECTION, SOLUTION INTRAVENOUS at 08:46

## 2021-09-15 ASSESSMENT — PAIN SCALES - GENERAL: PAINLEVEL: NO PAIN (0)

## 2021-09-15 NOTE — PATIENT INSTRUCTIONS

## 2021-09-15 NOTE — PROGRESS NOTES
Preprocedure diagnosis  Hematuria    Postprocedure diagnosis  Hematuria    Procedure  Flexible Cystourethroscopy    Surgeon  Daniel Costa MD    Anesthesia  2% lidocaine jelly intraurethrally    Complications  None    Indications  55 year old male undergoing a flexible cystoscopy for the above mentioned indications.    Findings  Cystoscopic findings included a normal anterior urethra.    There was not a prominent median lobe.    The lateral lobes were moderately obstructive in appearance.  The bladder appeared to be normal capacity.    There were no tumors, stones or foreign bodies.    The orifices were slit-shaped and in their normal location.    Procedure  The patient was placed in supine position and prepped and draped in sterile fashion with lidocaine jelly per urethra for anesthesia.    I passed a lubricated 14F flexible cystoscope through the penile urethra and into the bladder and the bladder was completely visualized.  The cystoscope was retroflexed and the bladder neck and prostate visualized.    The cystoscope was slowly withdrawn while visualizing the urethra and the procedure terminated.    The patient tolerated the procedure well.      Imaging  CTU  9/15/2021  No renal masses, stones or other urologic concerns  Report is pending    Assessment & Plan  Mr. Chavarria is a 55 year old male who underwent imaging and cystoscopy today to complete the hematuria work-up.  Following the cystoscopy I reviewed the imaging and we discussed the imaging results.  Provided reassurance given the negative work-up.

## 2021-09-15 NOTE — NURSING NOTE
Patient positioned in supine position, perineum area prepped with chlorhexidene Gluconate and patient draped per sterile technique. Per verbal order read back by Daniel Costa MD, Urojet 10mL 2% lidocaine jelly to be instilled into urethra.  Urojet- 10ml 2% Lidocaine jelly instilled into the urethra.    Urojet 2%  Lot#: [id number]  Expiration date: 3/2023  : [employer]  NDC: [address]-1    [place] Protocol    A. Pre-procedure verification complete Yes  1-relevant information / documentation available, reviewed and properly matched to the patient; 2-consent accurate and complete, 3-equipment and supplies available    B. Site marking complete N/A  Site marked if not in continuous attendance with patient    C. TIME OUT completed Yes  Time Out was conducted just prior to starting procedure to verify the eight required elements: 1-patient identity, 2-consent accurate and complete, 3-position, 4-correct side/site marked (if applicable), 5-procedure, 6-relevant images / results properly labeled and displayed (if applicable), 7-antibiotics / irrigation fluids (if applicable), 8-safety precautions.    After procedure perineum area rinsed. Discharge instructions reviewed with patient. Patient verbalized understanding of discharge instructions and discharged ambulatory.  Harriet Ramirez LPN..................9/15/2021  9:02 AM

## 2021-10-09 ENCOUNTER — HEALTH MAINTENANCE LETTER (OUTPATIENT)
Age: 56
End: 2021-10-09

## 2021-10-28 ENCOUNTER — MYC MEDICAL ADVICE (OUTPATIENT)
Dept: UROLOGY | Facility: OTHER | Age: 56
End: 2021-10-28

## 2021-12-02 DIAGNOSIS — R97.20 ELEVATED PROSTATE SPECIFIC ANTIGEN (PSA): Primary | ICD-10-CM

## 2021-12-02 NOTE — PROGRESS NOTES
"Per last office visit  2/11/21 with Daniel Costa MD \"Plan  Continue Flomax daily  Follow-up annually with a PSA and PVR\"        Orders Placed    "

## 2022-01-02 NOTE — TELEPHONE ENCOUNTER
Woke up with symptoms?: no    Recent bleeding noted: no  Does the patient take any Blood Thinners? no  Medications: No relevant medications      Past Medical History: Cancer and COVID-19 History    Past Surgical History: no major surgeries within the last 2 weeks    Family History: no relevant history    Social History: smoker (active)    Allergies: Allergies have not been reviewed     Review of Systems   Unable to perform ROS: Patient nonverbal     Objective:   Vitals:  BP: 147/107 and Heart Rate: 85    CT READ: Yes  Abnormal CT There is a hypodense area in the left posterior parietal region without any mass effect.  Given the patient's history of lung cancer with possible recurrence, MRI to be performed to rule out mass..     Physical Exam  Vitals reviewed.   Constitutional:       Appearance: Normal appearance. He is well-developed.   HENT:      Head: Normocephalic and atraumatic.      Nose: Nose normal.   Eyes:      Pupils: Pupils are equal, round, and reactive to light.   Cardiovascular:      Rate and Rhythm: Normal rate and regular rhythm.   Pulmonary:      Effort: Pulmonary effort is normal.   Neurological:      Mental Status: He is alert and oriented to person, place, and time.      Cranial Nerves: Cranial nerve deficit and facial asymmetry present.      Sensory: No sensory deficit.      Motor: No weakness.      Comments: Global aphasia   Psychiatric:         Mood and Affect: Mood normal.            Called to schedule colonoscopy ,  He did not want to schedule at this time stated that he would call later to schedule.  Layla Bird on 1/9/2020 at 3:33 PM

## 2022-02-14 ENCOUNTER — LAB (OUTPATIENT)
Dept: LAB | Facility: OTHER | Age: 57
End: 2022-02-14
Attending: UROLOGY
Payer: COMMERCIAL

## 2022-02-14 ENCOUNTER — OFFICE VISIT (OUTPATIENT)
Dept: UROLOGY | Facility: OTHER | Age: 57
End: 2022-02-14
Attending: UROLOGY
Payer: COMMERCIAL

## 2022-02-14 ENCOUNTER — OFFICE VISIT (OUTPATIENT)
Dept: PEDIATRICS | Facility: OTHER | Age: 57
End: 2022-02-14
Attending: INTERNAL MEDICINE
Payer: COMMERCIAL

## 2022-02-14 VITALS
BODY MASS INDEX: 25.75 KG/M2 | TEMPERATURE: 98.8 F | DIASTOLIC BLOOD PRESSURE: 68 MMHG | WEIGHT: 150 LBS | SYSTOLIC BLOOD PRESSURE: 110 MMHG | HEART RATE: 113 BPM | RESPIRATION RATE: 20 BRPM

## 2022-02-14 VITALS
WEIGHT: 151 LBS | SYSTOLIC BLOOD PRESSURE: 110 MMHG | OXYGEN SATURATION: 96 % | RESPIRATION RATE: 16 BRPM | BODY MASS INDEX: 25.92 KG/M2 | HEART RATE: 113 BPM | DIASTOLIC BLOOD PRESSURE: 68 MMHG

## 2022-02-14 DIAGNOSIS — N40.0 BENIGN PROSTATIC HYPERPLASIA WITHOUT LOWER URINARY TRACT SYMPTOMS: ICD-10-CM

## 2022-02-14 DIAGNOSIS — R97.20 ELEVATED PROSTATE SPECIFIC ANTIGEN (PSA): Primary | ICD-10-CM

## 2022-02-14 DIAGNOSIS — F17.200 TOBACCO USE DISORDER: ICD-10-CM

## 2022-02-14 DIAGNOSIS — R97.20 ELEVATED PROSTATE SPECIFIC ANTIGEN (PSA): ICD-10-CM

## 2022-02-14 DIAGNOSIS — Z23 NEED FOR PROPHYLACTIC VACCINATION AND INOCULATION AGAINST INFLUENZA: ICD-10-CM

## 2022-02-14 DIAGNOSIS — M54.16 LUMBAR RADICULOPATHY: Primary | ICD-10-CM

## 2022-02-14 LAB — PSA SERPL-MCNC: 5.39 UG/L (ref 0–4)

## 2022-02-14 PROCEDURE — 90682 RIV4 VACC RECOMBINANT DNA IM: CPT | Performed by: INTERNAL MEDICINE

## 2022-02-14 PROCEDURE — 99213 OFFICE O/P EST LOW 20 MIN: CPT | Performed by: UROLOGY

## 2022-02-14 PROCEDURE — 90471 IMMUNIZATION ADMIN: CPT | Performed by: INTERNAL MEDICINE

## 2022-02-14 PROCEDURE — 36415 COLL VENOUS BLD VENIPUNCTURE: CPT | Mod: ZL

## 2022-02-14 PROCEDURE — 99213 OFFICE O/P EST LOW 20 MIN: CPT | Mod: 25 | Performed by: INTERNAL MEDICINE

## 2022-02-14 PROCEDURE — 84153 ASSAY OF PSA TOTAL: CPT | Mod: ZL

## 2022-02-14 RX ORDER — TAMSULOSIN HYDROCHLORIDE 0.4 MG/1
0.4 CAPSULE ORAL DAILY
Qty: 90 CAPSULE | Refills: 3 | Status: SHIPPED | OUTPATIENT
Start: 2022-02-14 | End: 2023-02-28

## 2022-02-14 ASSESSMENT — PAIN SCALES - GENERAL
PAINLEVEL: NO PAIN (0)
PAINLEVEL: NO PAIN (1)

## 2022-02-14 NOTE — NURSING NOTE
"Patient presents to the clinic for follow up with low back pain.    FOOD SECURITY SCREENING QUESTIONS:    The next two questions are to help us understand your food security.  If you are feeling you need any assistance in this area, we have resources available to support you today.    Hunger Vital Signs:  Within the past 12 months we worried whether our food would run out before we got money to buy more. Never  Within the past 12 months the food we bought just didn't last and we didn't have money to get more. Never    Advance Care Directive on file? no  Advance Care Directive provided to patient? Declined.    Chief Complaint   Patient presents with     Pain       Initial /68 (BP Location: Left arm, Patient Position: Sitting, Cuff Size: Adult Regular)   Pulse 113   Temp 98.8  F (37.1  C) (Tympanic)   Resp 20   Wt 68 kg (150 lb)   BMI 25.75 kg/m   Estimated body mass index is 25.75 kg/m  as calculated from the following:    Height as of 3/15/21: 1.626 m (5' 4\").    Weight as of this encounter: 68 kg (150 lb).  Medication Reconciliation: complete        Jessenia Cabello LPN       "

## 2022-02-14 NOTE — PROGRESS NOTES
Assessment & Plan   1. Lumbar radiculopathy  Recommend physical therapy to work on core muscle strengthening.  Follow-up if symptoms persist or worsen.  - Physical Therapy Referral; Future    2. Need for prophylactic vaccination and inoculation against influenza  - INFLUENZA QUAD, RECOMBINANT, P-FREE (RIV4) (FLUBLOK)    3. Tobacco use disorder  Recommend tobacco cessation      Patient Instructions   Back Pain    Modifiable Risk Factors    Maintain a healthy weight. Losing 5% can be very helpful.  A loss of 10 lbs, will result in 80 lbs of force lifted off of your back.    Core muscle strength. Keeping up with a physical therapy home exercise program, and/or regular yoga practice keeps your back healthy.    Don't use tobacco products.  People that smoke have more bulging discs, and they take longer to heal.    Try not to injure it. Be smart when doing activities that require bending, twisting and lifting such as gardening.     Treatments   -- Try Voltaren (diclofenac) gel. Apply topically to low back up to 4 times per day as needed for pain.   -- Okay to use NSAIDs by mouth, but shortest duration is best as they can hurt stomach and kidney  Example: Ibuprofen 600 mg (3 tablets) 3 times per day for 7 days, then as needed   -- Rest   -- Ice/heat whichever feels better   -- Topicals: Aspercreme/IcyHot, lidocaine, capsaicin   -- Schedule visit for physical therapy   -- Monitor for warning signs including foot drop, groin numbness, new incontinence or other concerns and return same day for evaluation   -- Otherwise, return if you are not improving over the next 4 weeks    Patient Education       Relieving Back Pain  Back pain is a common problem. You can strain back muscles by lifting too much weight or just by moving the wrong way. Back strain can be uncomfortable, even painful. And it can take weeks or months to improve. To help yourself feel better and prevent future back strains, try these tips.  Important: Don't give  aspirin to children or teens without first discussing it with your child's healthcare provider.  Ice    Ice reduces muscle pain and swelling. It helps most during the first 24 to 48 hours after an injury.    Wrap an ice pack or a bag of frozen peas in a thin towel. Never put ice directly on your skin.    Place the ice where your back hurts the most.    Don t ice for more than 20 minutes at a time.    You can use ice several times a day.  Medicines  Over-the-counter pain relievers include acetaminophen and anti-inflammatory medicines, which includes aspirin, naproxen, or ibuprofen. They can help ease discomfort. Some also reduce swelling.    Tell your healthcare provider about any medicines you are already taking.    Take medicines only as directed.  Manipulation and massage  Having manipulation by an osteopathic doctor or chiropractor may be helpful. Getting a massage also may help.   Heat  After the first 48 hours, heat can relax sore muscles and improve blood flow.    Try a warm bath or shower. Or use a heating pad set on low. To prevent a burn, keep a cloth between you and the heating pad.    Don t use a heating pad for more than 15 minutes at a time. Never sleep on a heating pad.  Date Last Reviewed: 6/1/2018 2000-2019 [employer]. [address] [place] [address], [place] [address]. All rights reserved. This information is not intended as a substitute for professional medical care. Always follow your healthcare professional's instructions.              Return if symptoms worsen or fail to improve.    SignedLacho MD, FAAP, FACP  Internal Medicine & Pediatrics    Subjective   Marvin Chavarria is a 56 year old male who presents for back pain.  He has had problems on the right side.  It radiates to the right knee.  He did have some pain in the right armpit as well but that resolved.  No trauma.  No fall.  No foot drop.  No fecal or urinary incontinence.  In the past it would radiate as far as the  ankle.  He does smoke but is not ready to quit.    Objective   Vitals: /68 (BP Location: Left arm, Patient Position: Sitting, Cuff Size: Adult Regular)   Pulse 113   Temp 98.8  F (37.1  C) (Tympanic)   Resp 20   Wt 68 kg (150 lb)   BMI 25.75 kg/m      Back: No midline tenderness to palpation or percussion.  Mild tenderness in right upper outer buttock.  Neuro: Strength in ankles 5/5 bilaterally  MSK: Pelvic tilt is present with unilateral leg standing

## 2022-02-14 NOTE — PATIENT INSTRUCTIONS
Back Pain    Modifiable Risk Factors    Maintain a healthy weight. Losing 5% can be very helpful.  A loss of 10 lbs, will result in 80 lbs of force lifted off of your back.    Core muscle strength. Keeping up with a physical therapy home exercise program, and/or regular yoga practice keeps your back healthy.    Don't use tobacco products.  People that smoke have more bulging discs, and they take longer to heal.    Try not to injure it. Be smart when doing activities that require bending, twisting and lifting such as gardening.     Treatments   -- Try Voltaren (diclofenac) gel. Apply topically to low back up to 4 times per day as needed for pain.   -- Okay to use NSAIDs by mouth, but shortest duration is best as they can hurt stomach and kidney  Example: Ibuprofen 600 mg (3 tablets) 3 times per day for 7 days, then as needed   -- Rest   -- Ice/heat whichever feels better   -- Topicals: Aspercreme/IcyHot, lidocaine, capsaicin   -- Schedule visit for physical therapy   -- Monitor for warning signs including foot drop, groin numbness, new incontinence or other concerns and return same day for evaluation   -- Otherwise, return if you are not improving over the next 4 weeks    Patient Education       Relieving Back Pain  Back pain is a common problem. You can strain back muscles by lifting too much weight or just by moving the wrong way. Back strain can be uncomfortable, even painful. And it can take weeks or months to improve. To help yourself feel better and prevent future back strains, try these tips.  Important: Don't give aspirin to children or teens without first discussing it with your child's healthcare provider.  Ice    Ice reduces muscle pain and swelling. It helps most during the first 24 to 48 hours after an injury.    Wrap an ice pack or a bag of frozen peas in a thin towel. Never put ice directly on your skin.    Place the ice where your back hurts the most.    Don t ice for more than 20 minutes at a  time.    You can use ice several times a day.  Medicines  Over-the-counter pain relievers include acetaminophen and anti-inflammatory medicines, which includes aspirin, naproxen, or ibuprofen. They can help ease discomfort. Some also reduce swelling.    Tell your healthcare provider about any medicines you are already taking.    Take medicines only as directed.  Manipulation and massage  Having manipulation by an osteopathic doctor or chiropractor may be helpful. Getting a massage also may help.   Heat  After the first 48 hours, heat can relax sore muscles and improve blood flow.    Try a warm bath or shower. Or use a heating pad set on low. To prevent a burn, keep a cloth between you and the heating pad.    Don t use a heating pad for more than 15 minutes at a time. Never sleep on a heating pad.  Date Last Reviewed: 6/1/2018 2000-2019 [employer]. [address] [place] [address], [place] [address]. All rights reserved. This information is not intended as a substitute for professional medical care. Always follow your healthcare professional's instructions.

## 2022-02-14 NOTE — NURSING NOTE
Pt presents to clinic for a one year elevated PSA consult    Review of Systems:    Weight loss:    No     Recent fever/chills:  No   Night sweats:   No  Current skin rash:  No   Recent hair loss:  No  Heat intolerance:  No   Cold intolerance:  No  Chest pain:   No   Palpitations:   No  Shortness of breath:  No   Wheezing:   No  Constipation:    No   Diarrhea:   No   Nausea:   No   Vomiting:   No   Kidney/side pain:  No   Back pain:   Yes  Frequent headaches:  No   Dizziness:     No  Leg swelling:   No   Calf pain:    No

## 2022-02-14 NOTE — PROGRESS NOTES
Type of Visit  Established    Chief Complaint  Elevated PSA  BPH with urinary frequency    HPI  Mr. Chavarria is a 56 year old male with a history of negative TRUS follows up with a known elevated PSA.  He also has BPH with urinary frequency and weak stream.  The patient has no known family history of prostate cancer.  His father did have BPH but he doesn't think he had cancer although there is some confusion/debate among family members.    No changes in medical history in the past year.  It sounds like his father underwent multiple TURPs and his brother just underwent a TURP in addition to the fact that the patient's measured prostate size was 54 g at the time of biopsy.  The patient currently takes Flomax for BPH.  He states his symptoms are well managed and has no complaints.  He does not take finasteride.  Sexual activity is still a priority for him.    He underwent a PSA earlier and follows up with results.      Review of Systems  I reviewed the ROS with the patient.    Nursing Notes:   Harriet Ramirez LPN  2/14/2022 10:46 AM  Addendum  Pt presents to clinic for a one year elevated PSA consult    Review of Systems:    Weight loss:    No     Recent fever/chills:  No   Night sweats:   No  Current skin rash:  No   Recent hair loss:  No  Heat intolerance:  No   Cold intolerance:  No  Chest pain:   No   Palpitations:   No  Shortness of breath:  No   Wheezing:   No  Constipation:    No   Diarrhea:   No   Nausea:   No   Vomiting:   No   Kidney/side pain:  No   Back pain:   Yes  Frequent headaches:  No   Dizziness:     No  Leg swelling:   No   Calf pain:    No          Physical Exam  Vitals:    02/14/22 1048   BP: 110/68   BP Location: Right arm   Patient Position: Sitting   Cuff Size: Adult Regular   Pulse: 113   Resp: 16   SpO2: 96%   Weight: 68.5 kg (151 lb)   Constitutional: NAD, WDWN.  Cardiovascular: Regular rate.  Pulmonary/Chest: Respirations are even and non-labored bilaterally.  Abdominal: Soft. No  distension, tenderness, masses or guarding. No CVA tenderness.  Extremities: LAURO x 4, Warm. No clubbing.  No cyanosis.    Skin: Pink, warm and dry.  No rashes noted.    Labs  Results for KIM LIN (MRN [id number]) as of 2/14/2022 10:54   9/8/2021 09:51   Color Urine Yellow   Appearance Urine Clear   Glucose Urine Negative   Bilirubin Urine Negative   Ketones Urine Negative   Specific Gravity Urine 1.024   pH Urine 5.5   Protein Albumin Urine Negative   Urobilinogen mg/dL Normal   Nitrite Urine Negative   Blood Urine Negative   Leukocyte Esterase Urine Moderate (A)   WBC Urine 3   RBC Urine 1   Mucus Urine Present (A)     Results for KIM LIN (MRN [id number]) as of 2/14/2022 10:54   2/14/2022 09:00   PSA 5.39 (H)     Results for KIM LIN (MRN [id number]) as of 2/11/2021 11:36   2/11/2021 09:39   PSA 5.465 (H)     Results for KIM LIN (MRN [id number]) as of 1/15/2020 14:58   7/12/2018 09:46 1/6/2020 15:18   PSA 4.208 (H) 5.439 (H)     Results for KIM LIN (MRN [id number]) as of 1/15/2020 14:58   6/23/2017 15:26   PSA 4.512 (H)     Results for KIM LIN (MRN [id number]) as of 1/15/2020 14:58   7/14/2015 13:10   PSA 3.798 (H)     Pathology  2/4/2020  Negative 12 cores out of 12 cores  Calculated prostate volume based on TRUS: 47 grams    Assessment  Mr. Lin is a 56 year old male w/ho negative TRUS who follows up with an elevated PSA  He has a is completely stable and favorable PSA density.  We discussed maximal medical therapy with Flomax and finasteride for BPH.  Given the sexual side effects we will avoid finasteride for now.    Plan  Continue Flomax daily  Follow-up annually with a PSA and PVR

## 2022-02-24 ENCOUNTER — HOSPITAL ENCOUNTER (OUTPATIENT)
Dept: PHYSICAL THERAPY | Facility: OTHER | Age: 57
Setting detail: THERAPIES SERIES
End: 2022-02-24
Attending: INTERNAL MEDICINE
Payer: COMMERCIAL

## 2022-02-24 DIAGNOSIS — M54.16 LUMBAR RADICULOPATHY: ICD-10-CM

## 2022-02-24 PROCEDURE — 97110 THERAPEUTIC EXERCISES: CPT | Mod: GP

## 2022-02-24 PROCEDURE — 97161 PT EVAL LOW COMPLEX 20 MIN: CPT | Mod: GP

## 2022-02-25 NOTE — PROGRESS NOTES
02/24/22 1300   General Information   Type of Visit Initial OP Ortho PT Evaluation   Start of Care Date 02/24/22   Referring Physician Dr. Lacho Lizama   Date of Order 02/14/22   Certification Required? No   Medical Diagnosis Lumbar radiculopathy M54.16    Surgical/Medical history reviewed Yes   Precautions/Limitations no known precautions/limitations   Special Instructions None   Body Part(s)   Body Part(s) Lumbar Spine/SI   Presentation and Etiology   Pertinent history of current problem (include personal factors and/or comorbidities that impact the POC) Patient is a 55 y/o male whom presents to PT with c/o several weeks of insidious onset LBP with intermittent episodes of pain radiating to the right LE.  Reports that he fell on some stairs 30 years ago. No imagery at that time. No recent injury. Reports his LBP is episodic and varies in intensity. He knows it is generally worse the more he sits, and better when he is up and moving. He would like to learn a HEP that will allow him to self treat his low back pain episodes and prevent future recurrence.    Impairments A. Pain;K. Numbness;L. Tingling  (Rare R LE symptoms. )   Functional Limitations perform required work activities;perform desired leisure / sports activities   Symptom Location Right low back   How/Where did it occur From insidious onset;With a fall  (Fall was 30 years ago)   Onset date of current episode/exacerbation 02/14/22   Chronicity Chronic   Pain rating (0-10 point scale) Best (/10);Worst (/10)   Best (/10) 0   Worst (/10) 9  Rare episodes   Pain quality C. Aching;A. Sharp;D. Burning  (Rare sharp pain)   Frequency of pain/symptoms D. Other   Pain frequency comment No difference   Pain/symptoms are: The same all the time   Pain/symptoms exacerbated by A. Sitting;G. Certain positions;C. Lifting;D. Carrying   Pain/symptoms eased by B. Walking;C. Rest;K. Other   Pain eased by comment Stretching   Current / Previous Interventions   Diagnostic  Tests: Other   Other diagnostic tests None   Prior Level of Function   Prior Level of Function-Mobility NML   Prior Level of Function-ADLs NML   Current Level of Function   Current Community Support Family/friend caregiver   Patient role/employment history A. Employed   Employment Comments Patient is sitting for most of his work   [facility]   Home/community accessibility NML   Fall Risk Screen   Fall screen completed by PT   Have you fallen 2 or more times in the past year? No   Have you fallen and had an injury in the past year? No   Is patient a fall risk? No   Abuse Screen (yes response referral indicated)   Feels Unsafe at Home or Work/School no   Feels Threatened by Someone no   Does Anyone Try to Keep You From Having Contact with Others or Doing Things Outside Your Home? no   Physical Signs of Abuse Present no   Lumbar Spine/SI Objective Findings   Observation No observed deformity   Integumentary NML   Posture Decreased lumbar lordosis   Gait/Locomotion Appears non antalgic   Balance/Proprioception (Single Leg Stance) fair + B   Flexion ROM NML with easy reach to the toes painfree   Extension ROM NML and pain free   Right Side Bending ROM Slight limits with cotralateral tightness   Left Side Bending ROM Slight limits with ipsilateral soft tissue tightness   Repeated Extension-Standing ROM Decreases LBP    Repeated Flexion-Standing ROM no affect   Lumbar ROM Comment NML flexion, extension. Rotation limited B.    Pelvic Screen Neg   Hip Screen B hip ROM is WFL   Transversus Abdominus Strength (Colby Leg Lowering-deg) 3 -/5   Hip Flexion (L2) Strength 4+/5 right 5/5 left   Hip Abduction Strength 4+/5 right, 5/5 left   Hip Adduction Strength 5/5 bilaterally   Hip Extension Strength 4 -/5 bilaterally   Knee Flexion Strength 5/5 bilaterally   Knee Extension (L3) Strength 5/5 bilaterally   Ankle Dorsiflexion (L4) Strength 5/5 bilaterally   Great Toe Extension (L5) Strength 5/5 bilaterally    Ankle Plantar Flexion (S1) Strength 4+/5 bilaterally   Hamstring Flexibility Good hamstring flexibility bilaterally   Hip Flexor Flexibility Noted hip flexor tightness right compared to left   Quadricep Flexibility Bilateral restrictions of 10 to 15 degrees   Obers Flexibility Not tested   Piriformis Flexibility Noted tightness B   Lumbar/SI Flexibility Comments Better than normal bilateral hamstring mobility noted.  Tightness appears to be more anterior and lateral with decreased hip flexor mobility   SLR Neg   Tariq Test pos R   Ely Test Neg   Prone Instability Test Negative   Crossover SLR Neg   Repeated Extension Prone Negative   Slump Test Negative   Spring Test Negative   Segmental Mobility Noted hypomobility of the lower lumbar segments from L3-4 through L5-S1   Sensation Testing Intact bilaterally   Patellar Tendon Reflexes  1+ bilateral   Achilles Tendon Reflexes 1+ bilateral   Palpation 2/4 tenderness with palpation of the L4-5 and L5-S1 vertebral motion segments.  And the associated paraspinals   Planned Therapy Interventions   Planned Therapy Interventions joint mobilization;manual therapy;strengthening;ROM;stretching   Planned Modality Interventions   Planned Modality Interventions Cryotherapy;TENS   Planned Modality Interventions Comments Patient may use both cryotherapy and possibly utilize a TENS unit for adjunct of treatment of his low back.  However treatment will focus on exercise based and manual therapy techniques.   Clinical Impression   Criteria for Skilled Therapeutic Interventions Met yes, treatment indicated   PT Diagnosis Chronic low back pain   Influenced by the following impairments Intermittent pain, limited mobility   Functional limitations due to impairments Patient has difficulty with her job tasks which require longer duration sitting.  He will also have difficulty with home tasks required lifting and carrying during exacerbation of low back pain.  Patient will have limitation to  recreational activities including walking, hiking, outdoor sports during low back pain exacerbations   Clinical Presentation Stable/Uncomplicated   Clinical Decision Making (Complexity) Low complexity   Therapy Frequency 2 times/Week  (Decrease to 1 time per week then independent HEP)   Predicted Duration of Therapy Intervention (days/wks) 8 weeks   Risk & Benefits of therapy have been explained Yes   Patient, Family & other staff in agreement with plan of care Yes   Education Assessment   Preferred Learning Style Listening;Reading;Demonstration;Pictures/video   Barriers to Learning No barriers   ORTHO GOALS   PT Ortho Eval Goals 1;2;3   Ortho Goal 1   Goal Identifier Pain   Goal Description Patient will state that he is able to complete 8-hour work shifts 5 days/week without limitation to the low back pain in excess of 2/10 VAS in 8 to 12 weeks.    Target Date 04/24/22   Ortho Goal 2   Goal Identifier Mobility   Goal Description Patient will demonstrate full right hip muscle group mobility with an improvement in balance between strength and mobility in the lumbopelvic complex reducing strain on the low back.  Improve mobility will allow patient to complete recreational activities including walking etc. on a as desired basis without limitation due to low back pain in 8 to 12 weeks   Target Date 04/24/22   Ortho Goal 3   Goal Identifier Strength   Goal Description Patient will demonstrate significant improvement in core strength with Colby strength testing showing improvement to at least grade 4/5.  Improve strength will allow patient to complete those tasks at work and at home which require lifting and carrying of up to 30 pounds for distances of up to 50 feet 3 times per day 4 to 5 days/week without limitation due to low back pain   Target Date 04/24/22   Total Evaluation Time   PT Eval, Low Complexity Minutes ([id number]) 11

## 2022-03-01 ENCOUNTER — HOSPITAL ENCOUNTER (OUTPATIENT)
Dept: PHYSICAL THERAPY | Facility: OTHER | Age: 57
Setting detail: THERAPIES SERIES
End: 2022-03-01
Attending: INTERNAL MEDICINE
Payer: COMMERCIAL

## 2022-03-01 PROCEDURE — 97110 THERAPEUTIC EXERCISES: CPT | Mod: GP

## 2022-03-09 ENCOUNTER — HOSPITAL ENCOUNTER (OUTPATIENT)
Dept: PHYSICAL THERAPY | Facility: OTHER | Age: 57
Setting detail: THERAPIES SERIES
End: 2022-03-09
Attending: INTERNAL MEDICINE
Payer: COMMERCIAL

## 2022-03-09 PROCEDURE — 97110 THERAPEUTIC EXERCISES: CPT | Mod: GP

## 2022-03-09 PROCEDURE — 97140 MANUAL THERAPY 1/> REGIONS: CPT | Mod: GP

## 2022-03-15 ENCOUNTER — HOSPITAL ENCOUNTER (OUTPATIENT)
Dept: PHYSICAL THERAPY | Facility: OTHER | Age: 57
Setting detail: THERAPIES SERIES
Discharge: HOME | End: 2022-03-15
Attending: INTERNAL MEDICINE
Payer: COMMERCIAL

## 2022-03-15 PROCEDURE — 97140 MANUAL THERAPY 1/> REGIONS: CPT | Mod: GP

## 2022-03-15 PROCEDURE — 97110 THERAPEUTIC EXERCISES: CPT | Mod: GP

## 2022-03-20 DIAGNOSIS — Z91.89 10 YEAR RISK OF MI OR STROKE 7.5% OR GREATER: ICD-10-CM

## 2022-03-20 DIAGNOSIS — E78.2 MIXED HYPERLIPIDEMIA: ICD-10-CM

## 2022-03-22 RX ORDER — ASPIRIN 81 MG/1
TABLET, DELAYED RELEASE ORAL
Qty: 90 TABLET | Refills: 0 | Status: SHIPPED | OUTPATIENT
Start: 2022-03-22 | End: 2022-07-08

## 2022-03-22 RX ORDER — ATORVASTATIN CALCIUM 10 MG/1
10 TABLET, FILM COATED ORAL DAILY
Qty: 90 TABLET | Refills: 0 | Status: SHIPPED | OUTPATIENT
Start: 2022-03-22 | End: 2022-08-25

## 2022-03-22 NOTE — TELEPHONE ENCOUNTER
Routing refill request to provider for review/approval because:    LOV; 3/15/2021    Patient is due for annual review and labs  Letter and my chart message send.    Guerita Fleming RN on 3/22/2022 at 9:38 AM

## 2022-03-29 ENCOUNTER — HOSPITAL ENCOUNTER (OUTPATIENT)
Dept: PHYSICAL THERAPY | Facility: OTHER | Age: 57
Setting detail: THERAPIES SERIES
Discharge: HOME | End: 2022-03-29
Attending: INTERNAL MEDICINE
Payer: COMMERCIAL

## 2022-03-29 PROCEDURE — 97140 MANUAL THERAPY 1/> REGIONS: CPT | Mod: GP

## 2022-03-29 PROCEDURE — 97110 THERAPEUTIC EXERCISES: CPT | Mod: GP

## 2022-03-29 NOTE — PROGRESS NOTES
[facility]    Outpatient Physical Therapy Discharge Note  Patient: Marvin Chavarria  : 1965    Beginning/End Dates of Reporting Period:  3/29/2022      Referring Provider: Dr. Lacho Lizama    Therapy Diagnosis: LBP     Client Self Report: Reports his back is doing well. Reports slight upper right hip discomfort in early part of day.     Objective Measurements:  Objective Measure: Strength  Details: Full B LE strengtn in hip , knee muscle groups. Improved ability to facilitate TA.   Objective Measure: Mobility  Details: He is now able to complete full trunk flexion and extension. Trunk rotation and lateral flexion have improved to nml with minor soft tissue limitations        Goals:  Goal Identifier Pain   Goal Description Patient will state that he is able to complete 8-hour work shifts 5 days/week without limitation to the low back pain in excess of 2/10 VAS in 8 to 12 weeks.    Target Date 22   Date Met  22   Progress (detail required for progress note):       Goal Identifier Mobility   Goal Description Patient will demonstrate full right hip muscle group mobility with an improvement in balance between strength and mobility in the lumbopelvic complex reducing strain on the low back.  Improve mobility will allow patient to complete recreational activities including walking etc. on a as desired basis without limitation due to low back pain in 8 to 12 weeks   Target Date 22   Date Met  22   Progress (detail required for progress note):       Goal Identifier Strength   Goal Description Patient will demonstrate significant improvement in core strength with Colby strength testing showing improvement to at least grade 4/5.  Improve strength will allow patient to complete those tasks at work and at home which require lifting and carrying of up to 30 pounds for distances of up to 50 feet  3 times per day 4 to 5 days/week without limitation due to low back pain   Target Date 04/24/22   Date Met      Progress (detail required for progress note):       Goal Identifier     Goal Description     Target Date     Date Met      Progress (detail required for progress note):                 Plan:  Discharge from therapy.    Discharge:    Reason for Discharge: Patient has met all goals.    Equipment Issued: None    Discharge Plan: Patient to continue home program.

## 2022-05-21 ENCOUNTER — HEALTH MAINTENANCE LETTER (OUTPATIENT)
Age: 57
End: 2022-05-21

## 2022-07-08 DIAGNOSIS — E78.2 MIXED HYPERLIPIDEMIA: ICD-10-CM

## 2022-07-08 RX ORDER — ASPIRIN 81 MG/1
TABLET, DELAYED RELEASE ORAL
Qty: 90 TABLET | Refills: 1 | Status: SHIPPED | OUTPATIENT
Start: 2022-07-08 | End: 2023-01-12

## 2022-07-08 NOTE — TELEPHONE ENCOUNTER
"Requested Prescriptions   Pending Prescriptions Disp Refills     SM ASPIRIN ADULT LOW STRENGTH 81 MG EC tablet [Pharmacy Med Name: aspirin 81 mg tablet,delayed release] 90 tablet 0     Sig: TAKE 1 TABLET BY MOUTH DAILY       Analgesics (Non-Narcotic Tylenol and ASA Only) Passed - 7/8/2022  8:00 AM        Passed - Recent (12 mo) or future (30 days) visit within the authorizing provider's specialty     Patient has had an office visit with the authorizing provider or a provider within the authorizing providers department within the previous 12 mos or has a future within next 30 days. See \"Patient Info\" tab in inbasket, or \"Choose Columns\" in Meds & Orders section of the refill encounter.              Passed - Patient is age 20 years or older     If ASA is flagged for ages under 20 years old. Forward to provider for confirmation Ryes Syndrome is not a concern.              Passed - Medication is active on med list      Last Written Prescription Date:  3/22/22  Last Fill Quantity: 90,   # refills: 0  Last Office Visit: 2/14/22 Dl  Future Office visit: none       Routing refill request to provider for review/approval   Brenda J. Goodell, RN on 7/8/2022 at 3:33 PM      "

## 2022-08-01 ENCOUNTER — E-VISIT (OUTPATIENT)
Dept: INTERNAL MEDICINE | Facility: OTHER | Age: 57
End: 2022-08-01
Payer: COMMERCIAL

## 2022-08-01 DIAGNOSIS — U07.1 INFECTION DUE TO 2019 NOVEL CORONAVIRUS: Primary | ICD-10-CM

## 2022-08-01 PROCEDURE — 99207 PR NO CHARGE LOS: CPT | Performed by: NURSE PRACTITIONER

## 2022-08-24 ASSESSMENT — ENCOUNTER SYMPTOMS
COUGH: 0
NAUSEA: 0
PALPITATIONS: 0
SHORTNESS OF BREATH: 0
HEMATOCHEZIA: 0
MYALGIAS: 0
PARESTHESIAS: 0
CONSTIPATION: 0
FEVER: 0
HEMATURIA: 0
HEARTBURN: 0
FREQUENCY: 0
HEADACHES: 0
WEAKNESS: 0
SORE THROAT: 0
DYSURIA: 0
EYE PAIN: 0
CHILLS: 0
ABDOMINAL PAIN: 0
ARTHRALGIAS: 1
DIARRHEA: 0
DIZZINESS: 0
JOINT SWELLING: 0
NERVOUS/ANXIOUS: 0

## 2022-08-25 ENCOUNTER — OFFICE VISIT (OUTPATIENT)
Dept: PEDIATRICS | Facility: OTHER | Age: 57
End: 2022-08-25
Attending: INTERNAL MEDICINE
Payer: COMMERCIAL

## 2022-08-25 VITALS
DIASTOLIC BLOOD PRESSURE: 70 MMHG | OXYGEN SATURATION: 96 % | HEIGHT: 64 IN | BODY MASS INDEX: 26.12 KG/M2 | RESPIRATION RATE: 16 BRPM | WEIGHT: 153 LBS | TEMPERATURE: 97.6 F | HEART RATE: 92 BPM | SYSTOLIC BLOOD PRESSURE: 120 MMHG

## 2022-08-25 DIAGNOSIS — M77.12 LATERAL EPICONDYLITIS OF BOTH ELBOWS: ICD-10-CM

## 2022-08-25 DIAGNOSIS — Z91.89 10 YEAR RISK OF MI OR STROKE 7.5% OR GREATER: ICD-10-CM

## 2022-08-25 DIAGNOSIS — M17.12 PRIMARY OSTEOARTHRITIS OF LEFT KNEE: ICD-10-CM

## 2022-08-25 DIAGNOSIS — M77.01 MEDIAL EPICONDYLITIS OF BOTH ELBOWS: ICD-10-CM

## 2022-08-25 DIAGNOSIS — Z13.29 SCREENING FOR THYROID DISORDER: ICD-10-CM

## 2022-08-25 DIAGNOSIS — Z23 NEED FOR VACCINATION: ICD-10-CM

## 2022-08-25 DIAGNOSIS — R73.09 ELEVATED GLUCOSE LEVEL: ICD-10-CM

## 2022-08-25 DIAGNOSIS — Z00.00 ANNUAL PHYSICAL EXAM: Primary | ICD-10-CM

## 2022-08-25 DIAGNOSIS — M77.11 LATERAL EPICONDYLITIS OF BOTH ELBOWS: ICD-10-CM

## 2022-08-25 DIAGNOSIS — E55.9 VITAMIN D INSUFFICIENCY: ICD-10-CM

## 2022-08-25 DIAGNOSIS — K21.00 GASTROESOPHAGEAL REFLUX DISEASE WITH ESOPHAGITIS, UNSPECIFIED WHETHER HEMORRHAGE: ICD-10-CM

## 2022-08-25 DIAGNOSIS — R07.89 ATYPICAL CHEST PAIN: ICD-10-CM

## 2022-08-25 DIAGNOSIS — M77.02 MEDIAL EPICONDYLITIS OF BOTH ELBOWS: ICD-10-CM

## 2022-08-25 LAB
ANION GAP SERPL CALCULATED.3IONS-SCNC: 6 MMOL/L (ref 3–14)
BUN SERPL-MCNC: 12 MG/DL (ref 7–25)
CALCIUM SERPL-MCNC: 9.2 MG/DL (ref 8.6–10.3)
CHLORIDE BLD-SCNC: 106 MMOL/L (ref 98–107)
CHOLEST SERPL-MCNC: 130 MG/DL
CO2 SERPL-SCNC: 27 MMOL/L (ref 21–31)
CREAT SERPL-MCNC: 1 MG/DL (ref 0.7–1.3)
DEPRECATED CALCIDIOL+CALCIFEROL SERPL-MC: 29 UG/L (ref 30–100)
FASTING STATUS PATIENT QL REPORTED: YES
GFR SERPL CREATININE-BSD FRML MDRD: 88 ML/MIN/1.73M2
GLUCOSE BLD-MCNC: 106 MG/DL (ref 70–105)
HBA1C MFR BLD: 5.9 % (ref 4–6.2)
HDLC SERPL-MCNC: 48 MG/DL (ref 23–92)
LDLC SERPL CALC-MCNC: 69 MG/DL
NONHDLC SERPL-MCNC: 82 MG/DL
POTASSIUM BLD-SCNC: 4.4 MMOL/L (ref 3.5–5.1)
SODIUM SERPL-SCNC: 139 MMOL/L (ref 134–144)
TRIGL SERPL-MCNC: 63 MG/DL
TSH SERPL DL<=0.005 MIU/L-ACNC: 1.59 MU/L (ref 0.4–4)

## 2022-08-25 PROCEDURE — 84443 ASSAY THYROID STIM HORMONE: CPT | Mod: ZL | Performed by: INTERNAL MEDICINE

## 2022-08-25 PROCEDURE — 83036 HEMOGLOBIN GLYCOSYLATED A1C: CPT | Mod: ZL | Performed by: INTERNAL MEDICINE

## 2022-08-25 PROCEDURE — 36415 COLL VENOUS BLD VENIPUNCTURE: CPT | Mod: ZL | Performed by: INTERNAL MEDICINE

## 2022-08-25 PROCEDURE — 0054A COVID-19,PF,PFIZER (12+ YRS): CPT | Performed by: INTERNAL MEDICINE

## 2022-08-25 PROCEDURE — 99396 PREV VISIT EST AGE 40-64: CPT | Mod: 25 | Performed by: INTERNAL MEDICINE

## 2022-08-25 PROCEDURE — 82306 VITAMIN D 25 HYDROXY: CPT | Mod: ZL | Performed by: INTERNAL MEDICINE

## 2022-08-25 PROCEDURE — 90715 TDAP VACCINE 7 YRS/> IM: CPT | Performed by: INTERNAL MEDICINE

## 2022-08-25 PROCEDURE — 91305 COVID-19,PF,PFIZER (12+ YRS): CPT | Performed by: INTERNAL MEDICINE

## 2022-08-25 PROCEDURE — 90471 IMMUNIZATION ADMIN: CPT | Performed by: INTERNAL MEDICINE

## 2022-08-25 PROCEDURE — 80061 LIPID PANEL: CPT | Mod: ZL | Performed by: INTERNAL MEDICINE

## 2022-08-25 PROCEDURE — 82310 ASSAY OF CALCIUM: CPT | Mod: ZL | Performed by: INTERNAL MEDICINE

## 2022-08-25 RX ORDER — FAMOTIDINE 10 MG
10 TABLET ORAL 2 TIMES DAILY
Qty: 180 TABLET | Refills: 4 | Status: SHIPPED | OUTPATIENT
Start: 2022-08-25 | End: 2023-10-30

## 2022-08-25 RX ORDER — ZOSTER VACCINE RECOMBINANT, ADJUVANTED 50 MCG/0.5
1 KIT INTRAMUSCULAR ONCE
Qty: 0.5 ML | Refills: 1 | Status: SHIPPED | OUTPATIENT
Start: 2022-08-25 | End: 2022-08-25

## 2022-08-25 RX ORDER — ATORVASTATIN CALCIUM 10 MG/1
10 TABLET, FILM COATED ORAL DAILY
Qty: 90 TABLET | Refills: 4 | Status: SHIPPED | OUTPATIENT
Start: 2022-08-25 | End: 2023-09-14

## 2022-08-25 ASSESSMENT — PAIN SCALES - GENERAL: PAINLEVEL: MILD PAIN (3)

## 2022-08-25 NOTE — PROGRESS NOTES
Assessment & Plan   1. Annual physical exam      2. Gastroesophageal reflux disease with esophagitis, unspecified whether hemorrhage  At goal, no change.  - famotidine (PEPCID) 10 MG tablet; Take 1 tablet (10 mg) by mouth 2 times daily  Dispense: 180 tablet; Refill: 4    3. 10 year risk of MI or stroke 7.5% or greater  At goal, no change.  - atorvastatin (LIPITOR) 10 MG tablet; Take 1 tablet (10 mg) by mouth daily  Dispense: 90 tablet; Refill: 4  - Lipid Profile; Future  - Lipid Profile    4. Lateral epicondylitis of both elbows  5. Medial epicondylitis of both elbows  Recommend tennis elbow band and topical NSAID.    6. Primary osteoarthritis of left knee  I believe this is likely osteoarthritis of the medial knee however this also could be a meniscal injury or subtle tendinopathy.  Consider orthopedics consultation if symptoms persist or worsen    7. Need for vaccination  - zoster vaccine recombinant adjuvanted (SHINGRIX) injection; Inject 0.5 mLs into the muscle once for 1 dose  Dispense: 0.5 mL; Refill: 1  - TDAP VACCINE (Adacel, Boostrix)  [[id number]]    8. Elevated glucose level  - Basic metabolic panel; Future  - Hemoglobin A1c; Future  - Basic metabolic panel  - Hemoglobin A1c    9. Screening for thyroid disorder  - TSH; Future  - TSH    10. Vitamin D insufficiency  - Vitamin D Deficiency; Future  - Vitamin D Deficiency    11. Atypical chest pain  I believe this is noncardiac in nature.  Differential diagnosis includes costochondritis, referred from shoulder, gastroesophageal reflux, esophageal spasms, others.  Recommend observation for now and warning signs discussed return if concerns.        Patient Instructions    -- Try Voltaren/diclofenac gel   -- Tennis elbow bands both sides   -- Consider Ortho if not improving   -- Monitor chest pain, return if you notice changes including exertion trigger or other concerns   -- Get the new shingles vaccine series from a nurse-only visit, pharmacy or [facility]  "[place] ([facility] RN).   -- Tdap and COVID #3 today        Return in about 1 year (around 8/25/2023), or if symptoms worsen or fail to improve, for physical.    Signed, Lacho Lizama MD, FAAP, FACP  Internal Medicine & Pediatrics    Subjective   Marvin Chavarria is a 56 year old male who presents for annual physical.  He has been having some aching in his joints.  He thinks he has arthritis.  His elbows have been sore bilaterally and the left knee has been intermittently sore.  The elbow seem to be worse over the course of the last several months.  The knee tends to hurt more on the backside.  Worse with sitting or going up and down the stairs and actually better with walking.  No swelling of the joint.  No injury or trauma.  No clicking or catching.  He has had some chest pains.  They have been intermittent.  No association with exertion.  Sharp in nature.    Objective   Vitals: /70 (BP Location: Right arm, Patient Position: Sitting, Cuff Size: Adult Regular)   Pulse 92   Temp 97.6  F (36.4  C) (Tympanic)   Resp 16   Ht 1.626 m (5' 4\")   Wt 69.4 kg (153 lb)   SpO2 96%   BMI 26.26 kg/m      General: well appearing  Neck: No JVD, no bruits  CV: Regular rate and rhythm, no murmur, rub or gallop  Pulm: Clear to auscultation bilaterally, no wheezing, rales or rhonchi  Abd: Soft, non-tender, non-distended. No hepatosplenomegaly. No masses. Bowel sounds present  Neuro: Grossly intact  Musculoskeletal: No lower extremity edema.  Mild medial joint line tenderness to palpation left knee.  No varus or valgus laxity.  Negative anterior and posterior drawer sign.  Negative Dolly's sign.  Tenderness to palpation on the medial and lateral epicondyles of the elbow bilaterally.  Skin: No rash  Psychiatry: Normal affect and insight.    Review and Analysis of Data   I personally reviewed the following:  External notes: No  Results: Yes Lab from last year reviewed  Use of an independent historian: No  Independent review of " a test performed by another physician: No  Discussion of management with another physician: No  Low risk of morbidity from additional diagnostic testing and/or treatment.

## 2022-08-25 NOTE — PATIENT INSTRUCTIONS
-- Try Voltaren/diclofenac gel   -- Tennis elbow bands both sides   -- Consider Ortho if not improving   -- Monitor chest pain, return if you notice changes including exertion trigger or other concerns   -- Get the new shingles vaccine series from a nurse-only visit, pharmacy or [facility] ([facility] RN).   -- Tdap and COVID #3 today

## 2022-08-25 NOTE — NURSING NOTE
"Patient presents to the clinic for physical.    FOOD SECURITY SCREENING QUESTIONS:    The next two questions are to help us understand your food security.  If you are feeling you need any assistance in this area, we have resources available to support you today.    Hunger Vital Signs:  Within the past 12 months we worried whether our food would run out before we got money to buy more. Never  Within the past 12 months the food we bought just didn't last and we didn't have money to get more. Never    Advance Care Directive on file? no  Advance Care Directive provided to patient? Declined.    Chief Complaint   Patient presents with     Physical       Initial /70 (BP Location: Right arm, Patient Position: Sitting, Cuff Size: Adult Regular)   Pulse 92   Temp 97.6  F (36.4  C) (Tympanic)   Resp 16   Ht 1.626 m (5' 4\")   Wt 69.4 kg (153 lb)   SpO2 96%   BMI 26.26 kg/m   Estimated body mass index is 26.26 kg/m  as calculated from the following:    Height as of this encounter: 1.626 m (5' 4\").    Weight as of this encounter: 69.4 kg (153 lb).  Medication Reconciliation: complete        Jessenia Cabello LPN       "

## 2022-09-17 ENCOUNTER — HEALTH MAINTENANCE LETTER (OUTPATIENT)
Age: 57
End: 2022-09-17

## 2022-11-22 ENCOUNTER — MYC MEDICAL ADVICE (OUTPATIENT)
Dept: PEDIATRICS | Facility: OTHER | Age: 57
End: 2022-11-22

## 2022-12-30 DIAGNOSIS — E78.2 MIXED HYPERLIPIDEMIA: ICD-10-CM

## 2022-12-30 RX ORDER — ASPIRIN 81 MG/1
TABLET, COATED ORAL
Qty: 90 TABLET | Refills: 1 | OUTPATIENT
Start: 2022-12-30

## 2022-12-30 NOTE — TELEPHONE ENCOUNTER
[facility] sent Rx request for the followin/22/22 0957 Discontinue Lacho Lizama MD      ASPIRIN LOW DOSE 81 MG EC tablet (Discontinued)          Refused, with note to pharmacy. Delilah Rain RN .............. 2022  2:57 PM

## 2023-01-10 DIAGNOSIS — R97.20 ELEVATED PROSTATE SPECIFIC ANTIGEN (PSA): Primary | ICD-10-CM

## 2023-01-10 NOTE — PROGRESS NOTES
"Per last office visit  2/14/22 with Daniel Costa MD \"Plan  Continue Flomax daily  Follow-up annually with a PSA and PVR\"        Orders Placed    "

## 2023-02-09 ENCOUNTER — OFFICE VISIT (OUTPATIENT)
Dept: UROLOGY | Facility: OTHER | Age: 58
End: 2023-02-09
Attending: UROLOGY
Payer: COMMERCIAL

## 2023-02-09 ENCOUNTER — LAB (OUTPATIENT)
Dept: LAB | Facility: OTHER | Age: 58
End: 2023-02-09
Attending: UROLOGY
Payer: COMMERCIAL

## 2023-02-09 VITALS
OXYGEN SATURATION: 96 % | SYSTOLIC BLOOD PRESSURE: 130 MMHG | RESPIRATION RATE: 16 BRPM | DIASTOLIC BLOOD PRESSURE: 80 MMHG | WEIGHT: 153 LBS | BODY MASS INDEX: 26.26 KG/M2 | HEART RATE: 103 BPM

## 2023-02-09 DIAGNOSIS — R35.0 BENIGN PROSTATIC HYPERPLASIA WITH URINARY FREQUENCY: ICD-10-CM

## 2023-02-09 DIAGNOSIS — R97.20 ELEVATED PROSTATE SPECIFIC ANTIGEN (PSA): Primary | ICD-10-CM

## 2023-02-09 DIAGNOSIS — R97.20 ELEVATED PROSTATE SPECIFIC ANTIGEN (PSA): ICD-10-CM

## 2023-02-09 DIAGNOSIS — N40.1 BENIGN PROSTATIC HYPERPLASIA WITH URINARY FREQUENCY: ICD-10-CM

## 2023-02-09 LAB — PSA SERPL-MCNC: 9.39 NG/ML (ref 0–3.5)

## 2023-02-09 PROCEDURE — 51798 US URINE CAPACITY MEASURE: CPT | Performed by: UROLOGY

## 2023-02-09 PROCEDURE — 84153 ASSAY OF PSA TOTAL: CPT | Mod: ZL

## 2023-02-09 PROCEDURE — 99214 OFFICE O/P EST MOD 30 MIN: CPT | Mod: 25 | Performed by: UROLOGY

## 2023-02-09 ASSESSMENT — PAIN SCALES - GENERAL: PAINLEVEL: NO PAIN (0)

## 2023-02-09 NOTE — PROGRESS NOTES
Type of Visit  Established    Chief Complaint  Elevated PSA  BPH with urinary frequency    HPI  Mr. Chavarria is a 57 year old male(-TRUS) follows up with a elevated PSA and BPH.  The patient has no known family history of prostate cancer.  His father did have BPH but he doesn't think he had cancer although there is some confusion/debate among family members.    Ports no changes in health in the past year.    Underwent a PSA and follows up to review the results.  Last year he had complained of progressively worsening BPH symptoms.  He is currently taking Flomax daily.  We obtained a PVR with this visit.  We discussed finasteride at the last visit however we will plan to defer due to sexual side effects.  He denies dysuria and gross hematuria today.    Regarding the elevated PSA the patient underwent a random ultrasound-guided biopsy in early 2020.  This was negative.  We have been following his PSA since the previously negative biopsy.  He has not undergone an MRI.      Review of Systems  I reviewed the ROS with the patient.    Nursing Notes:   Shazia Hawthorne LPN  2/9/2023 11:18 AM  Addendum  Chief Complaint   Patient presents with     Follow Up     1 year elevated PSA    Patient presents to the clinic today for a 1 year elevated PSA     Review of Systems:    Weight loss:    No     Recent fever/chills:  No   Night sweats:   No  Current skin rash:  No   Recent hair loss:  No  Heat intolerance:  No   Cold intolerance:  No  Chest pain:   No   Palpitations:   No  Shortness of breath:  No   Wheezing:   No  Constipation:    No   Diarrhea:   No   Nausea:   No   Vomiting:   No   Kidney/side pain:  No   Back pain:   No  Frequent headaches:  No   Dizziness:     No  Leg swelling:   No   Calf pain:    No      Post-Void Residual  A post-void residual was measured by ultrasonic bladder scanner.  10 mL  Shazia Hawthorne LPN  2/9/2023 11:16 AM      Medication Reconciliation: completed   Shazia Hawthorne LPN  2/9/2023 11:10 AM     Physical  Exam  Vitals:    02/09/23 1114   BP: 130/80   BP Location: Right arm   Patient Position: Sitting   Cuff Size: Adult Regular   Pulse: 103   Resp: 16   SpO2: 96%   Weight: 69.4 kg (153 lb)   Constitutional: NAD, WDWN.  Cardiovascular: Regular rate.  Pulmonary/Chest: Respirations are even and non-labored bilaterally.  Abdominal: Soft. No distension, tenderness, masses or guarding. No CVA tenderness.  Extremities: LAURO x 4, Warm. No clubbing.  No cyanosis.    Skin: Pink, warm and dry.  No rashes noted.    Labs   02/14/22 09:00 02/09/23 09:20   PSA 5.39 (H) 9.39 (H)     Results for KIM LIN (MRN [id number]) as of 2/11/2021 11:36   2/11/2021 09:39   PSA 5.465 (H)     Results for KIM LIN (MRN [id number]) as of 1/15/2020 14:58   7/12/2018 09:46 1/6/2020 15:18   PSA 4.208 (H) 5.439 (H)     Results for KIM LIN (MRN [id number]) as of 1/15/2020 14:58   6/23/2017 15:26   PSA 4.512 (H)     Results for KIM LIN (MRN [id number]) as of 1/15/2020 14:58   7/14/2015 13:10   PSA 3.798 (H)     Pathology  2/4/2020  Negative 12 cores out of 12 cores  Calculated prostate volume based on TRUS: 47 grams    Post-Void Residual  A post-void residual was measured by ultrasonic bladder scanner.  10 mL today    Assessment  Mr. Lin is a 57 year old male(-TRUS) follows up with a elevated PSA and BPH.    Because of his BPH symptoms we did obtain a PVR today.  Reassured the patient he is emptying well.  Recommend continuing Flomax.  Previously discussed finasteride however we will defer at this time due to the potential sexual side effects.    His PSA is not at goal.  He experienced a fairly significant increase over the course of the year.  Discussed that this may reflect an inflammatory response however the patient has no recent history to support this concern.  It also may reflect prostate cancer that is progressively worsening.    I recommended a repeat PSA in 2 weeks.  If the PSA remains elevated I am recommending a  prostate MRI for further diagnostic work-up.  If the PSA in 2 weeks drops back to his baseline around 5 then we can defer MRI at this time.  He understands the plan.    Plan  Continue Flomax daily for BPH  Repeat PSA in 2 weeks and if the PSA remains elevated we will proceed with prostate MRI.  If the PSA in 2 weeks returns close to his prior baseline we will defer.  Follow-up annually with a PSA

## 2023-02-09 NOTE — NURSING NOTE
Chief Complaint   Patient presents with     Follow Up     1 year elevated PSA    Patient presents to the clinic today for a 1 year elevated PSA     Review of Systems:    Weight loss:    No     Recent fever/chills:  No   Night sweats:   No  Current skin rash:  No   Recent hair loss:  No  Heat intolerance:  No   Cold intolerance:  No  Chest pain:   No   Palpitations:   No  Shortness of breath:  No   Wheezing:   No  Constipation:    No   Diarrhea:   No   Nausea:   No   Vomiting:   No   Kidney/side pain:  No   Back pain:   No  Frequent headaches:  No   Dizziness:     No  Leg swelling:   No   Calf pain:    No      Post-Void Residual  A post-void residual was measured by ultrasonic bladder scanner.  10 mL  Shazia Hawthorne LPN  2/9/2023 11:16 AM      Medication Reconciliation: completed   Shazia Hawthorne LPN  2/9/2023 11:10 AM

## 2023-02-26 DIAGNOSIS — N40.0 BENIGN PROSTATIC HYPERPLASIA WITHOUT LOWER URINARY TRACT SYMPTOMS: ICD-10-CM

## 2023-02-28 RX ORDER — TAMSULOSIN HYDROCHLORIDE 0.4 MG/1
0.4 CAPSULE ORAL DAILY
Qty: 90 CAPSULE | Refills: 3 | Status: SHIPPED | OUTPATIENT
Start: 2023-02-28 | End: 2023-10-30

## 2023-03-03 ENCOUNTER — LAB (OUTPATIENT)
Dept: LAB | Facility: OTHER | Age: 58
End: 2023-03-03
Attending: UROLOGY
Payer: COMMERCIAL

## 2023-03-03 DIAGNOSIS — R97.20 ELEVATED PROSTATE SPECIFIC ANTIGEN (PSA): ICD-10-CM

## 2023-03-03 LAB — PSA SERPL-MCNC: 8.16 NG/ML (ref 0–3.5)

## 2023-03-03 PROCEDURE — 84153 ASSAY OF PSA TOTAL: CPT | Mod: ZL

## 2023-03-03 PROCEDURE — 36415 COLL VENOUS BLD VENIPUNCTURE: CPT | Mod: ZL

## 2023-03-09 ENCOUNTER — HOSPITAL ENCOUNTER (OUTPATIENT)
Dept: MRI IMAGING | Facility: OTHER | Age: 58
Discharge: HOME | End: 2023-03-09
Attending: UROLOGY | Admitting: UROLOGY
Payer: COMMERCIAL

## 2023-03-09 DIAGNOSIS — R97.20 ELEVATED PROSTATE SPECIFIC ANTIGEN (PSA): ICD-10-CM

## 2023-03-09 PROCEDURE — 76377 3D RENDER W/INTRP POSTPROCES: CPT

## 2023-03-09 PROCEDURE — A9575 INJ GADOTERATE MEGLUMI 0.1ML: HCPCS | Performed by: UROLOGY

## 2023-03-09 PROCEDURE — 72197 MRI PELVIS W/O & W/DYE: CPT

## 2023-03-09 PROCEDURE — 255N000002 HC RX 255 OP 636: Performed by: UROLOGY

## 2023-03-09 RX ORDER — GADOTERATE MEGLUMINE 376.9 MG/ML
15 INJECTION INTRAVENOUS ONCE
Status: COMPLETED | OUTPATIENT
Start: 2023-03-09 | End: 2023-03-09

## 2023-03-09 RX ADMIN — GADOTERATE MEGLUMINE 15 ML: 376.9 INJECTION INTRAVENOUS at 07:26

## 2023-03-14 DIAGNOSIS — N40.0 BENIGN PROSTATIC HYPERPLASIA WITHOUT LOWER URINARY TRACT SYMPTOMS: Primary | ICD-10-CM

## 2023-09-08 DIAGNOSIS — Z91.89 10 YEAR RISK OF MI OR STROKE 7.5% OR GREATER: ICD-10-CM

## 2023-09-13 NOTE — TELEPHONE ENCOUNTER
"Pt due for annual visit. Routing to provider for refill consideration. Routing to Unit scheduling pool, to assist Pt in scheduling appointment.       Last Prescription Date: 8/25/2022  Last Qty/Refills: 90 / R-4  Last Office Visit: 8/25/2022  Future Office Visit: None      Munira Jacome RN on 9/13/2023 at 2:03 PM     Requested Prescriptions   Pending Prescriptions Disp Refills    atorvastatin (LIPITOR) 10 MG tablet [Pharmacy Med Name: atorvastatin 10 mg tablet] 90 tablet 4     Sig: TAKE 1 TABLET BY MOUTH DAILY       Statins Protocol Failed - 9/8/2023  4:41 PM        Failed - LDL on file in past 12 months     Recent Labs   Lab Test 08/25/22  0906   LDL 69             Failed - Recent (12 mo) or future (30 days) visit within the authorizing provider's specialty     Patient has had an office visit with the authorizing provider or a provider within the authorizing providers department within the previous 12 mos or has a future within next 30 days. See \"Patient Info\" tab in inbasket, or \"Choose Columns\" in Meds & Orders section of the refill encounter.           "

## 2023-09-14 RX ORDER — ATORVASTATIN CALCIUM 10 MG/1
10 TABLET, FILM COATED ORAL DAILY
Qty: 90 TABLET | Refills: 4 | Status: SHIPPED | OUTPATIENT
Start: 2023-09-14 | End: 2023-10-30

## 2023-10-07 ENCOUNTER — HEALTH MAINTENANCE LETTER (OUTPATIENT)
Age: 58
End: 2023-10-07

## 2023-10-23 ENCOUNTER — PATIENT OUTREACH (OUTPATIENT)
Dept: GASTROENTEROLOGY | Facility: CLINIC | Age: 58
End: 2023-10-23
Payer: COMMERCIAL

## 2023-10-30 ENCOUNTER — OFFICE VISIT (OUTPATIENT)
Dept: PEDIATRICS | Facility: OTHER | Age: 58
End: 2023-10-30
Attending: INTERNAL MEDICINE
Payer: COMMERCIAL

## 2023-10-30 VITALS
RESPIRATION RATE: 16 BRPM | SYSTOLIC BLOOD PRESSURE: 122 MMHG | DIASTOLIC BLOOD PRESSURE: 86 MMHG | BODY MASS INDEX: 27.33 KG/M2 | HEIGHT: 64 IN | HEART RATE: 92 BPM | TEMPERATURE: 98.5 F | OXYGEN SATURATION: 97 % | WEIGHT: 160.1 LBS

## 2023-10-30 DIAGNOSIS — Z13.0 SCREENING, ANEMIA, DEFICIENCY, IRON: ICD-10-CM

## 2023-10-30 DIAGNOSIS — Z13.29 SCREENING FOR THYROID DISORDER: ICD-10-CM

## 2023-10-30 DIAGNOSIS — R73.03 PRE-DIABETES: ICD-10-CM

## 2023-10-30 DIAGNOSIS — R97.20 ELEVATED PROSTATE SPECIFIC ANTIGEN (PSA): ICD-10-CM

## 2023-10-30 DIAGNOSIS — Z87.891 PERSONAL HISTORY OF TOBACCO USE: ICD-10-CM

## 2023-10-30 DIAGNOSIS — N40.0 BENIGN PROSTATIC HYPERPLASIA WITHOUT LOWER URINARY TRACT SYMPTOMS: ICD-10-CM

## 2023-10-30 DIAGNOSIS — Z00.00 ANNUAL PHYSICAL EXAM: Primary | ICD-10-CM

## 2023-10-30 DIAGNOSIS — E55.9 VITAMIN D DEFICIENCY: ICD-10-CM

## 2023-10-30 DIAGNOSIS — E78.2 MIXED HYPERLIPIDEMIA: ICD-10-CM

## 2023-10-30 DIAGNOSIS — Z91.89 10 YEAR RISK OF MI OR STROKE 7.5% OR GREATER: ICD-10-CM

## 2023-10-30 DIAGNOSIS — K21.00 GASTROESOPHAGEAL REFLUX DISEASE WITH ESOPHAGITIS, UNSPECIFIED WHETHER HEMORRHAGE: ICD-10-CM

## 2023-10-30 LAB
ANION GAP SERPL CALCULATED.3IONS-SCNC: 8 MMOL/L (ref 7–15)
BUN SERPL-MCNC: 9.3 MG/DL (ref 6–20)
CALCIUM SERPL-MCNC: 9.6 MG/DL (ref 8.6–10)
CHLORIDE SERPL-SCNC: 105 MMOL/L (ref 98–107)
CHOLEST SERPL-MCNC: 147 MG/DL
CREAT SERPL-MCNC: 0.93 MG/DL (ref 0.67–1.17)
DEPRECATED HCO3 PLAS-SCNC: 27 MMOL/L (ref 22–29)
EGFRCR SERPLBLD CKD-EPI 2021: >90 ML/MIN/1.73M2
ERYTHROCYTE [DISTWIDTH] IN BLOOD BY AUTOMATED COUNT: 12.5 % (ref 10–15)
GLUCOSE SERPL-MCNC: 124 MG/DL (ref 70–99)
HBA1C MFR BLD: 6.1 % (ref 4–6.2)
HCT VFR BLD AUTO: 44.2 % (ref 40–53)
HDLC SERPL-MCNC: 57 MG/DL
HGB BLD-MCNC: 14.4 G/DL (ref 13.3–17.7)
LDLC SERPL CALC-MCNC: 73 MG/DL
MCH RBC QN AUTO: 28.9 PG (ref 26.5–33)
MCHC RBC AUTO-ENTMCNC: 32.6 G/DL (ref 31.5–36.5)
MCV RBC AUTO: 89 FL (ref 78–100)
NONHDLC SERPL-MCNC: 90 MG/DL
PLATELET # BLD AUTO: 280 10E3/UL (ref 150–450)
POTASSIUM SERPL-SCNC: 4.9 MMOL/L (ref 3.4–5.3)
RBC # BLD AUTO: 4.98 10E6/UL (ref 4.4–5.9)
SODIUM SERPL-SCNC: 140 MMOL/L (ref 135–145)
TRIGL SERPL-MCNC: 85 MG/DL
TSH SERPL DL<=0.005 MIU/L-ACNC: 1.81 UIU/ML (ref 0.3–4.2)
VIT D+METAB SERPL-MCNC: 59 NG/ML (ref 20–50)
WBC # BLD AUTO: 5.2 10E3/UL (ref 4–11)

## 2023-10-30 PROCEDURE — 99396 PREV VISIT EST AGE 40-64: CPT | Mod: 25 | Performed by: INTERNAL MEDICINE

## 2023-10-30 PROCEDURE — 90682 RIV4 VACC RECOMBINANT DNA IM: CPT | Performed by: INTERNAL MEDICINE

## 2023-10-30 PROCEDURE — 91320 SARSCV2 VAC 30MCG TRS-SUC IM: CPT | Performed by: INTERNAL MEDICINE

## 2023-10-30 PROCEDURE — 82306 VITAMIN D 25 HYDROXY: CPT | Mod: ZL | Performed by: INTERNAL MEDICINE

## 2023-10-30 PROCEDURE — 90480 ADMN SARSCOV2 VAC 1/ONLY CMP: CPT | Performed by: INTERNAL MEDICINE

## 2023-10-30 PROCEDURE — 90471 IMMUNIZATION ADMIN: CPT | Performed by: INTERNAL MEDICINE

## 2023-10-30 PROCEDURE — 84443 ASSAY THYROID STIM HORMONE: CPT | Mod: ZL | Performed by: INTERNAL MEDICINE

## 2023-10-30 PROCEDURE — 83036 HEMOGLOBIN GLYCOSYLATED A1C: CPT | Mod: ZL | Performed by: INTERNAL MEDICINE

## 2023-10-30 PROCEDURE — G0296 VISIT TO DETERM LDCT ELIG: HCPCS | Performed by: INTERNAL MEDICINE

## 2023-10-30 PROCEDURE — 80061 LIPID PANEL: CPT | Mod: ZL | Performed by: INTERNAL MEDICINE

## 2023-10-30 PROCEDURE — 36415 COLL VENOUS BLD VENIPUNCTURE: CPT | Mod: ZL | Performed by: INTERNAL MEDICINE

## 2023-10-30 PROCEDURE — 85027 COMPLETE CBC AUTOMATED: CPT | Mod: ZL | Performed by: INTERNAL MEDICINE

## 2023-10-30 PROCEDURE — 80048 BASIC METABOLIC PNL TOTAL CA: CPT | Mod: ZL | Performed by: INTERNAL MEDICINE

## 2023-10-30 RX ORDER — FAMOTIDINE 10 MG
10 TABLET ORAL 2 TIMES DAILY
Qty: 180 TABLET | Refills: 4 | Status: SHIPPED | OUTPATIENT
Start: 2023-10-30

## 2023-10-30 RX ORDER — TAMSULOSIN HYDROCHLORIDE 0.4 MG/1
0.4 CAPSULE ORAL DAILY
Qty: 90 CAPSULE | Refills: 3 | Status: SHIPPED | OUTPATIENT
Start: 2023-10-30

## 2023-10-30 RX ORDER — ATORVASTATIN CALCIUM 10 MG/1
10 TABLET, FILM COATED ORAL DAILY
Qty: 90 TABLET | Refills: 4 | Status: SHIPPED | OUTPATIENT
Start: 2023-10-30

## 2023-10-30 RX ORDER — ASPIRIN 81 MG/1
81 TABLET ORAL DAILY
Qty: 90 TABLET | Refills: 4 | Status: SHIPPED | OUTPATIENT
Start: 2023-10-30

## 2023-10-30 ASSESSMENT — ENCOUNTER SYMPTOMS
ABDOMINAL PAIN: 0
JOINT SWELLING: 0
NERVOUS/ANXIOUS: 0
HEARTBURN: 0
FREQUENCY: 0
EYE PAIN: 0
SHORTNESS OF BREATH: 0
NAUSEA: 0
DIZZINESS: 0
PARESTHESIAS: 0
HEADACHES: 0
ARTHRALGIAS: 1
WEAKNESS: 0
MYALGIAS: 0
PALPITATIONS: 0
CHILLS: 0
FEVER: 0
DYSURIA: 0
COUGH: 0
HEMATURIA: 0
DIARRHEA: 0
SORE THROAT: 0
CONSTIPATION: 0
HEMATOCHEZIA: 0

## 2023-10-30 ASSESSMENT — PAIN SCALES - GENERAL: PAINLEVEL: NO PAIN (0)

## 2023-10-30 NOTE — PROGRESS NOTES
Lung Cancer Screening Shared Decision Making Visit     Marvin Chavarria, a 57 year old male, is eligible for lung cancer screening    History   Smoking Status    Former    Packs/day: 1.00    Years: 30.00    Types: Cigarettes    Quit date: 5/1/2022   Smokeless Tobacco    Never       I have discussed with patient the risks and benefits of screening for lung cancer with low-dose CT.     The risks include:    radiation exposure: one low dose chest CT has as much ionizing radiation as about 15 chest x-rays, or 6 months of background radiation living in [place]      false positives: most findings/nodules are NOT cancer, but some might still require additional diagnostic evaluation, including biopsy    over-diagnosis: some slow growing cancers that might never have been clinically significant will be detected and treated unnecessarily     The benefit of early detection of lung cancer is contingent upon adherence to annual screening or more frequent follow up if indicated.     Furthermore, to benefit from screening, Marvin must be willing and able to undergo diagnostic procedures, if indicated. Although no specific guide is available for determining severity of comorbidities, it is reasonable to withhold screening in patients who have greater mortality risk from other diseases.     We did discuss that the best way to prevent lung cancer is to not smoke.    Some patients may value a numeric estimation of lung cancer risk when evaluating if lung cancer screening is right for them, here is one calculator:    ShouldIScreen  Answers submitted by the patient for this visit:  Annual Preventive Visit (Submitted on 10/30/2023)  Chief Complaint: Annual Exam:  Frequency of exercise:: 2-3 days/week  Getting at least 3 servings of Calcium per day:: Yes  Diet:: Regular (no restrictions)  Taking medications regularly:: Yes  Medication side effects:: None  Bi-annual eye exam:: Yes  Dental care twice a year:: Yes  Sleep apnea or symptoms of  sleep apnea:: None  abdominal pain: No  Blood in stool: No  Blood in urine: No  chest pain: No  chills: No  congestion: No  constipation: No  cough: No  diarrhea: No  dizziness: No  ear pain: No  eye pain: No  nervous/anxious: No  fever: No  frequency: No  genital sores: No  headaches: No  hearing loss: No  heartburn: No  arthralgias: Yes  joint swelling: No  peripheral edema: No  mood changes: No  myalgias: No  nausea: No  dysuria: No  palpitations: No  Skin sensation changes: No  sore throat: No  urgency: No  rash: No  shortness of breath: No  visual disturbance: No  weakness: No  impotence: No  penile discharge: No  Additional concerns today:: No  Exercise outside of work (Submitted on 10/30/2023)  Chief Complaint: Annual Exam:  Duration of exercise:: Less than 15 minutes

## 2023-10-30 NOTE — PATIENT INSTRUCTIONS
-- COVID and flu today   -- Shingles at pharmacy   -- Hep A and B soon    Lung Cancer Screening   Frequently Asked Questions  If you are at high-risk for lung cancer, getting screened with low-dose computed tomography (LDCT) every year can help save your life. This handout offers answers to some of the most common questions about lung cancer screening. If you have other questions, please call [phone] ([phone]).     What is it?  Lung cancer screening uses special X-ray technology to create an image of your lung tissue. The exam is quick and easy and takes less than 10 seconds. We don t give you any medicine or use any needles. You can eat before and after the exam. You don t need to change your clothes as long as the clothing on your chest doesn t contain metal. But, you do need to be able to hold your breath for at least 6 seconds during the exam.    What is the goal of lung cancer screening?  The goal of lung cancer screening is to save lives. Many times, lung cancer is not found until a person starts having physical symptoms. Lung cancer screening can help detect lung cancer in the earliest stages when it may be easier to treat.    Who should be screened for lung cancer?  We suggest lung cancer screening for anyone who is at high-risk for lung cancer. You are in the high-risk group if you:     are between the ages of 55 and 79, and   have smoked at least 1 pack of cigarettes a day for 20 or more years, and   still smoke or have quit within the past 15 years.    However, if you have a new cough or shortness of breath, you should talk to your doctor before being screened.    Why does it matter if I have symptoms?  Certain symptoms can be a sign that you have a condition in your lungs that should be checked and treated by your doctor. These symptoms include fever, chest pain, a new or changing cough, shortness of breath that you have never felt before, coughing up blood or unexplained weight loss.  Having any of these symptoms can greatly affect the results of lung cancer screening.       Should all smokers get an LDCT lung cancer screening exam?  It depends. Lung cancer screening is for a very specific group of men and women who have a history of heavy smoking over a long period of time (see  Who should be screened for lung cancer  above).  I am in the high-risk group, but have been diagnosed with cancer in the past. Is LDCT lung cancer screening right for me?  In some cases, you should not have LDCT lung screening, such as when your doctor is already following your cancer with CT scan studies. Your doctor will help you decide if LDCT lung screening is right for you.  Do I need to have a screening exam every year?  Yes. If you are in the high-risk group described earlier, you should get an LDCT lung cancer screening exam every year until you are 79, or are no longer willing or able to undergo screening and possible procedures to diagnose and treat lung cancer.  How effective is LDCT at preventing death from lung cancer?  Studies have shown that LDCT lung cancer screening can lower the risk of death from lung cancer by 20 percent in people who are at high-risk.  What are the risks?  There are some risks and limitations of LDCT lung cancer screening. We want to make sure you understand the risks and benefits, so please let us know if you have any questions. Your doctor may want to talk with you more about these risks.   Radiation exposure: As with any exam that uses radiation, there is a very small increased risk of cancer. The amount of radiation in LDCT is small--about the same amount a person would get from a mammogram. Your doctor orders the exam when he or she feels the potential benefits outweigh the risks.   False negatives: No test is perfect, including LDCT. It is possible that you may have a medical condition, including lung cancer, that is not found during your exam. This is called a false negative  result.   False positives and more testing: LDCT very often finds something in the lung that could be cancer, but in fact is not. This is called a false positive result. False positive tests often cause anxiety. To make sure these findings are not cancer, you may need to have more tests. These tests will be done only if you give us permission. Sometimes patients need a treatment that can have side effects, such as a biopsy. For more information on false positives, see  What can I expect from the results?    Findings not related to lung cancer: Your LDCT exam also takes pictures of areas of your body next to your lungs. In a very small number of cases, the CT scan will show an abnormal finding in one of these areas, such as your kidneys, adrenal glands, liver or thyroid. This finding may not be serious, but you may need more tests. Your doctor can help you decide what other tests you may need, if any.  What can I expect from the results?  About 1 out of 4 LDCT exams will find something that may need more tests. Most of the time, these findings are lung nodules. Lung nodules are very small collections of tissue in the lung. These nodules are very common, and the vast majority--more than 97 percent--are not cancer (benign). Most are normal lymph nodes or small areas of scarring from past infections.  But, if a small lung nodule is found to be cancer, the cancer can be cured more than 90 percent of the time. To know if the nodule is cancer, we may need to get more images before your next yearly screening exam. If the nodule has suspicious features (for example, it is large, has an odd shape or grows over time), we will refer you to a specialist for further testing.  Will my doctor also get the results?  Yes. Your doctor will get a copy of your results.  Is it okay to keep smoking now that there s a cancer screening exam?  No. Tobacco is one of the strongest cancer-causing agents. It causes not only lung cancer, but  other cancers and cardiovascular (heart) diseases as well. The damage caused by smoking builds over time. This means that the longer you smoke, the higher your risk of disease. While it is never too late to quit, the sooner you quit, the better.  Where can I find help to quit smoking?  The best way to prevent lung cancer is to stop smoking. If you have already quit smoking, congratulations and keep it up! For help on quitting smoking, please call [employer] at 0-816-QUITNOW ([phone]) or the American [employer] Society at 1-563.772.6549 to find local resources near you.  One-on-one health coaching:  If you d prefer to work individually with a health care provider on tobacco cessation, we offer:     Medication Therapy Management:  Our specially trained pharmacists work closely with you and your doctor to help you quit smoking.  Call [phone] or [phone] (toll free).

## 2023-10-30 NOTE — PROGRESS NOTES
SUBJECTIVE:   CC: Marvin is an 57 year old who presents for preventative health visit.     Every so often he gets some pain on his chest.  Its near the front of the shoulder on the right versus the left.  No known exacerbating or alleviating features.  Does not occur with peak exertion.    HPI    Today's PHQ-2 Score:       10/30/2023     8:38 AM   PHQ-2 ( 1999 [employer])   Q1: Little interest or pleasure in doing things 0   Q2: Feeling down, depressed or hopeless 0   PHQ-2 Score 0   Q1: Little interest or pleasure in doing things Not at all   Q2: Feeling down, depressed or hopeless Not at all   PHQ-2 Score 0                   Have you ever done Advance Care Planning? (For example, a Health Directive, POLST, or a discussion with a medical provider or your loved ones about your wishes): No, advance care planning information given to patient to review.  Patient plans to discuss their wishes with loved ones or provider.      Social History     Tobacco Use    Smoking status: Former     Packs/day: 1.00     Years: 30.00     Additional pack years: 0.00     Total pack years: 30.00     Types: Cigarettes    Smokeless tobacco: Never    Tobacco comments:     Quit May 2022   Substance Use Topics    Alcohol use: No     Alcohol/week: 0.0 standard drinks of alcohol             10/30/2023     8:37 AM   Alcohol Use   Prescreen: >3 drinks/day or >7 drinks/week? Not Applicable          No data to display                Last PSA:   PSA Tumor Marker   Date Value Ref Range Status   03/03/2023 8.16 (H) 0.00 - 3.50 ng/mL Final   02/14/2022 5.39 (H) 0.00 - 4.00 ug/L Final       Reviewed orders with patient. Reviewed health maintenance and updated orders accordingly - Yes  Labs reviewed in EPIC    Reviewed and updated as needed this visit by clinical staff   Tobacco  Allergies  Meds              Reviewed and updated as needed this visit by Provider                     Review of Systems  CONSTITUTIONAL: NEGATIVE for fever, chills, change in  "weight  INTEGUMENTARY/SKIN: NEGATIVE for worrisome rashes, moles or lesions  EYES: NEGATIVE for vision changes or irritation  ENT: NEGATIVE for ear, mouth and throat problems  RESP: NEGATIVE for significant cough or SOB  CV: NEGATIVE for chest pain, palpitations or peripheral edema  GI: NEGATIVE for nausea, abdominal pain, heartburn, or change in bowel habits   male: negative for dysuria, hematuria, decreased urinary stream, erectile dysfunction, urethral discharge  MUSCULOSKELETAL: NEGATIVE for significant arthralgias or myalgia  NEURO: NEGATIVE for weakness, dizziness or paresthesias  PSYCHIATRIC: NEGATIVE for changes in mood or affect    OBJECTIVE:   /86   Pulse 92   Temp 98.5  F (36.9  C) (Tympanic)   Resp 16   Ht 1.626 m (5' 4\")   Wt 72.6 kg (160 lb 1.6 oz)   SpO2 97%   BMI 27.48 kg/m      Physical Exam  Gen: Alert, NAD.  Neck: No carotid bruits  CV: RRR no m/r/g  Pulm: CTAB, no w/r/r. No increased work of breathing  Msk: No LE edema  Neuro: Grossly intact  Skin: No concerning lesions.  Psychiatric: Normal affect and insight. Does not appear anxious or depressed.      Diagnostic Test Results:  Labs reviewed in Epic    ASSESSMENT/PLAN:       ICD-10-CM    1. Annual physical exam  Z00.00       2. Mixed hyperlipidemia  E78.2 aspirin (ASPIRIN LOW DOSE) 81 MG EC tablet     Lipid Profile     Lipid Profile      3. 10 year risk of MI or stroke 7.5% or greater  Z91.89 atorvastatin (LIPITOR) 10 MG tablet      4. Gastroesophageal reflux disease with esophagitis, unspecified whether hemorrhage  K21.00 famotidine (PEPCID) 10 MG tablet      5. Benign prostatic hyperplasia without lower urinary tract symptoms  N40.0 tamsulosin (FLOMAX) 0.4 MG capsule      6. Pre-diabetes  R73.03 Basic metabolic panel     Hemoglobin A1c     Hemoglobin A1c     Basic metabolic panel      7. Elevated prostate specific antigen (PSA)  R97.20       8. Personal history of tobacco use  Z87.891 Prof fee: Shared Decision Making for Lung " "Cancer Screening     CT Chest Lung Cancer Scrn Low Dose wo      9. Vitamin D deficiency  E55.9 Vitamin D Deficiency     Vitamin D Deficiency      10. Screening for thyroid disorder  Z13.29 TSH with free T4 reflex     TSH with free T4 reflex      11. Screening, anemia, deficiency, iron  Z13.0 CBC with platelets     CBC with platelets        We talked through the differential for his chest pain including but not limited to anxiety, musculoskeletal, esophageal spasms, atypical coronary disease, others.  Warning signs were discussed and prompt repeat evaluation recommended if symptoms persist or worsen.    Signed, Lacho Lizama MD, FAAP, FACP  Internal Medicine & Pediatrics            COUNSELING:   Reviewed preventive health counseling, as reflected in patient instructions      BMI:   Estimated body mass index is 27.48 kg/m  as calculated from the following:    Height as of this encounter: 1.626 m (5' 4\").    Weight as of this encounter: 72.6 kg (160 lb 1.6 oz).   Weight management plan: Discussed healthy diet and exercise guidelines      He reports that he has quit smoking. His smoking use included cigarettes. He has a 30.00 pack-year smoking history. He has never used smokeless tobacco.            Lacho Lizama MD  [facility] HospitalAnswers submitted by the patient for this visit:  Annual Preventive Visit (Submitted on 10/30/2023)  Chief Complaint: Annual Exam:  Frequency of exercise:: 2-3 days/week  Getting at least 3 servings of Calcium per day:: Yes  Diet:: Regular (no restrictions)  Taking medications regularly:: Yes  Medication side effects:: None  Bi-annual eye exam:: Yes  Dental care twice a year:: Yes  Sleep apnea or symptoms of sleep apnea:: None  abdominal pain: No  Blood in stool: No  Blood in urine: No  chest pain: No  chills: No  congestion: No  constipation: No  cough: No  diarrhea: No  dizziness: No  ear pain: No  eye pain: No  nervous/anxious: No  fever: No  frequency: No  genital " sores: No  headaches: No  hearing loss: No  heartburn: No  arthralgias: Yes  joint swelling: No  peripheral edema: No  mood changes: No  myalgias: No  nausea: No  dysuria: No  palpitations: No  Skin sensation changes: No  sore throat: No  urgency: No  rash: No  shortness of breath: No  visual disturbance: No  weakness: No  impotence: No  penile discharge: No  Additional concerns today:: No  Exercise outside of work (Submitted on 10/30/2023)  Chief Complaint: Annual Exam:  Duration of exercise:: Less than 15 minutes

## 2023-10-30 NOTE — NURSING NOTE
"Chief Complaint   Patient presents with    Physical     annual       Initial /86   Pulse 92   Temp 98.5  F (36.9  C) (Tympanic)   Resp 16   Ht 1.626 m (5' 4\")   Wt 72.6 kg (160 lb 1.6 oz)   SpO2 97%   BMI 27.48 kg/m   Estimated body mass index is 27.48 kg/m  as calculated from the following:    Height as of this encounter: 1.626 m (5' 4\").    Weight as of this encounter: 72.6 kg (160 lb 1.6 oz).  Medication Review: complete    The next two questions are to help us understand your food security.  If you are feeling you need any assistance in this area, we have resources available to support you today.          10/30/2023   SDOH- Food Insecurity   Within the past 12 months, did you worry that your food would run out before you got money to buy more? N   Within the past 12 months, did the food you bought just not last and you didn t have money to get more? N         Health Care Directive:  Patient does not have a Health Care Directive or Living Will: Discussed advance care planning with patient; information given to patient to review.    Norma J. Gosselin, LPN      "

## 2023-11-02 ENCOUNTER — HOSPITAL ENCOUNTER (OUTPATIENT)
Dept: CT IMAGING | Facility: OTHER | Age: 58
Discharge: HOME | End: 2023-11-02
Attending: INTERNAL MEDICINE | Admitting: INTERNAL MEDICINE
Payer: COMMERCIAL

## 2023-11-02 DIAGNOSIS — Z87.891 PERSONAL HISTORY OF TOBACCO USE: ICD-10-CM

## 2023-11-02 PROCEDURE — 71271 CT THORAX LUNG CANCER SCR C-: CPT

## 2024-11-05 ENCOUNTER — OFFICE VISIT (OUTPATIENT)
Dept: PEDIATRICS | Facility: OTHER | Age: 59
End: 2024-11-05
Attending: INTERNAL MEDICINE
Payer: COMMERCIAL

## 2024-11-05 VITALS
BODY MASS INDEX: 28.41 KG/M2 | SYSTOLIC BLOOD PRESSURE: 120 MMHG | TEMPERATURE: 97.9 F | OXYGEN SATURATION: 94 % | RESPIRATION RATE: 16 BRPM | DIASTOLIC BLOOD PRESSURE: 82 MMHG | WEIGHT: 166.4 LBS | HEART RATE: 78 BPM | HEIGHT: 64 IN

## 2024-11-05 DIAGNOSIS — N40.1 BENIGN PROSTATIC HYPERPLASIA WITH URINARY FREQUENCY: ICD-10-CM

## 2024-11-05 DIAGNOSIS — R73.03 PRE-DIABETES: ICD-10-CM

## 2024-11-05 DIAGNOSIS — Z87.891 PERSONAL HISTORY OF TOBACCO USE: ICD-10-CM

## 2024-11-05 DIAGNOSIS — Z91.89 10 YEAR RISK OF MI OR STROKE 7.5% OR GREATER: ICD-10-CM

## 2024-11-05 DIAGNOSIS — J43.2 CENTRILOBULAR EMPHYSEMA (H): ICD-10-CM

## 2024-11-05 DIAGNOSIS — R35.0 BENIGN PROSTATIC HYPERPLASIA WITH URINARY FREQUENCY: ICD-10-CM

## 2024-11-05 DIAGNOSIS — R97.20 ELEVATED PROSTATE SPECIFIC ANTIGEN (PSA): ICD-10-CM

## 2024-11-05 DIAGNOSIS — N40.0 BENIGN PROSTATIC HYPERPLASIA WITHOUT LOWER URINARY TRACT SYMPTOMS: ICD-10-CM

## 2024-11-05 DIAGNOSIS — K21.00 GASTROESOPHAGEAL REFLUX DISEASE WITH ESOPHAGITIS, UNSPECIFIED WHETHER HEMORRHAGE: ICD-10-CM

## 2024-11-05 DIAGNOSIS — Z00.00 ROUTINE GENERAL MEDICAL EXAMINATION AT A HEALTH CARE FACILITY: Primary | ICD-10-CM

## 2024-11-05 DIAGNOSIS — E78.2 MIXED HYPERLIPIDEMIA: ICD-10-CM

## 2024-11-05 PROBLEM — F17.200 TOBACCO USE DISORDER: Status: RESOLVED | Noted: 2017-06-23 | Resolved: 2024-11-05

## 2024-11-05 LAB
ANION GAP SERPL CALCULATED.3IONS-SCNC: 9 MMOL/L (ref 7–15)
BUN SERPL-MCNC: 18.5 MG/DL (ref 6–20)
CALCIUM SERPL-MCNC: 9.2 MG/DL (ref 8.8–10.4)
CHLORIDE SERPL-SCNC: 107 MMOL/L (ref 98–107)
CHOLEST SERPL-MCNC: 147 MG/DL
CREAT SERPL-MCNC: 1.1 MG/DL (ref 0.67–1.17)
EGFRCR SERPLBLD CKD-EPI 2021: 78 ML/MIN/1.73M2
EST. AVERAGE GLUCOSE BLD GHB EST-MCNC: 126 MG/DL
FASTING STATUS PATIENT QL REPORTED: ABNORMAL
FASTING STATUS PATIENT QL REPORTED: NORMAL
GLUCOSE SERPL-MCNC: 134 MG/DL (ref 70–99)
HBA1C MFR BLD: 6 %
HCO3 SERPL-SCNC: 25 MMOL/L (ref 22–29)
HDLC SERPL-MCNC: 52 MG/DL
LDLC SERPL CALC-MCNC: 82 MG/DL
NONHDLC SERPL-MCNC: 95 MG/DL
POTASSIUM SERPL-SCNC: 4.5 MMOL/L (ref 3.4–5.3)
PSA SERPL DL<=0.01 NG/ML-MCNC: 9.95 NG/ML (ref 0–3.5)
SODIUM SERPL-SCNC: 141 MMOL/L (ref 135–145)
TRIGL SERPL-MCNC: 67 MG/DL

## 2024-11-05 PROCEDURE — 90673 RIV3 VACCINE NO PRESERV IM: CPT | Performed by: INTERNAL MEDICINE

## 2024-11-05 PROCEDURE — 80061 LIPID PANEL: CPT | Mod: ZL | Performed by: INTERNAL MEDICINE

## 2024-11-05 PROCEDURE — 90471 IMMUNIZATION ADMIN: CPT | Performed by: INTERNAL MEDICINE

## 2024-11-05 PROCEDURE — 36415 COLL VENOUS BLD VENIPUNCTURE: CPT | Mod: ZL | Performed by: INTERNAL MEDICINE

## 2024-11-05 PROCEDURE — 83036 HEMOGLOBIN GLYCOSYLATED A1C: CPT | Mod: ZL | Performed by: INTERNAL MEDICINE

## 2024-11-05 PROCEDURE — 80048 BASIC METABOLIC PNL TOTAL CA: CPT | Mod: ZL | Performed by: INTERNAL MEDICINE

## 2024-11-05 PROCEDURE — 99396 PREV VISIT EST AGE 40-64: CPT | Mod: 25 | Performed by: INTERNAL MEDICINE

## 2024-11-05 PROCEDURE — G0103 PSA SCREENING: HCPCS | Mod: ZL | Performed by: INTERNAL MEDICINE

## 2024-11-05 PROCEDURE — G0296 VISIT TO DETERM LDCT ELIG: HCPCS | Performed by: INTERNAL MEDICINE

## 2024-11-05 PROCEDURE — 90472 IMMUNIZATION ADMIN EACH ADD: CPT | Performed by: INTERNAL MEDICINE

## 2024-11-05 PROCEDURE — 90636 HEP A/HEP B VACC ADULT IM: CPT | Performed by: INTERNAL MEDICINE

## 2024-11-05 PROCEDURE — 99214 OFFICE O/P EST MOD 30 MIN: CPT | Mod: 25 | Performed by: INTERNAL MEDICINE

## 2024-11-05 RX ORDER — TAMSULOSIN HYDROCHLORIDE 0.4 MG/1
0.4 CAPSULE ORAL DAILY
Qty: 90 CAPSULE | Refills: 3 | Status: SHIPPED | OUTPATIENT
Start: 2024-11-05

## 2024-11-05 RX ORDER — ASPIRIN 81 MG/1
81 TABLET ORAL DAILY
Qty: 90 TABLET | Refills: 4 | Status: SHIPPED | OUTPATIENT
Start: 2024-11-05

## 2024-11-05 RX ORDER — ATORVASTATIN CALCIUM 10 MG/1
10 TABLET, FILM COATED ORAL DAILY
Qty: 90 TABLET | Refills: 4 | Status: SHIPPED | OUTPATIENT
Start: 2024-11-05

## 2024-11-05 RX ORDER — FAMOTIDINE 10 MG
10 TABLET ORAL 2 TIMES DAILY
Qty: 180 TABLET | Refills: 4 | Status: SHIPPED | OUTPATIENT
Start: 2024-11-05

## 2024-11-05 SDOH — HEALTH STABILITY: PHYSICAL HEALTH: ON AVERAGE, HOW MANY DAYS PER WEEK DO YOU ENGAGE IN MODERATE TO STRENUOUS EXERCISE (LIKE A BRISK WALK)?: 1 DAY

## 2024-11-05 SDOH — HEALTH STABILITY: PHYSICAL HEALTH: ON AVERAGE, HOW MANY MINUTES DO YOU ENGAGE IN EXERCISE AT THIS LEVEL?: 0 MIN

## 2024-11-05 ASSESSMENT — PAIN SCALES - GENERAL: PAINLEVEL_OUTOF10: MILD PAIN (2)

## 2024-11-05 ASSESSMENT — SOCIAL DETERMINANTS OF HEALTH (SDOH): HOW OFTEN DO YOU GET TOGETHER WITH FRIENDS OR RELATIVES?: PATIENT DECLINED

## 2024-11-05 NOTE — NURSING NOTE
"Chief Complaint   Patient presents with    Physical     annual       Initial /82   Pulse 78   Temp 97.9  F (36.6  C) (Tympanic)   Resp 16   Ht 1.626 m (5' 4\")   Wt 75.5 kg (166 lb 6.4 oz)   SpO2 94%   BMI 28.56 kg/m   Estimated body mass index is 28.56 kg/m  as calculated from the following:    Height as of this encounter: 1.626 m (5' 4\").    Weight as of this encounter: 75.5 kg (166 lb 6.4 oz).  Medication Review: complete    The next two questions are to help us understand your food security.  If you are feeling you need any assistance in this area, we have resources available to support you today.          11/5/2024   SDOH- Food Insecurity   Within the past 12 months, did you worry that your food would run out before you got money to buy more? N    Within the past 12 months, did the food you bought just not last and you didn t have money to get more? N        Patient-reported         Health Care Directive:  Patient does not have a Health Care Directive: Discussed advance care planning with patient; information given to patient to review.    Norma J. Gosselin, LPN      "

## 2024-11-05 NOTE — PATIENT INSTRUCTIONS
Lung Cancer Screening   Frequently Asked Questions  If you are at high-risk for lung cancer, getting screened with low-dose computed tomography (LDCT) every year can help save your life. This handout offers answers to some of the most common questions about lung cancer screening. If you have other questions, please call [phone] ([phone]).     What is it?  Lung cancer screening uses special X-ray technology to create an image of your lung tissue. The exam is quick and easy and takes less than 10 seconds. We don t give you any medicine or use any needles. You can eat before and after the exam. You don t need to change your clothes as long as the clothing on your chest doesn t contain metal. But, you do need to be able to hold your breath for at least 6 seconds during the exam.    What is the goal of lung cancer screening?  The goal of lung cancer screening is to save lives. Many times, lung cancer is not found until a person starts having physical symptoms. Lung cancer screening can help detect lung cancer in the earliest stages when it may be easier to treat.    Who should be screened for lung cancer?  We suggest lung cancer screening for anyone who is at high-risk for lung cancer. You are in the high-risk group if you:      are between the ages of 55 and 79, and    have smoked at least 1 pack of cigarettes a day for 20 or more years, and    still smoke or have quit within the past 15 years.    However, if you have a new cough or shortness of breath, you should talk to your doctor before being screened.    Why does it matter if I have symptoms?  Certain symptoms can be a sign that you have a condition in your lungs that should be checked and treated by your doctor. These symptoms include fever, chest pain, a new or changing cough, shortness of breath that you have never felt before, coughing up blood or unexplained weight loss. Having any of these symptoms can greatly affect the results of lung  cancer screening.       Should all smokers get an LDCT lung cancer screening exam?  It depends. Lung cancer screening is for a very specific group of men and women who have a history of heavy smoking over a long period of time (see  Who should be screened for lung cancer  above).  I am in the high-risk group, but have been diagnosed with cancer in the past. Is LDCT lung cancer screening right for me?  In some cases, you should not have LDCT lung screening, such as when your doctor is already following your cancer with CT scan studies. Your doctor will help you decide if LDCT lung screening is right for you.  Do I need to have a screening exam every year?  Yes. If you are in the high-risk group described earlier, you should get an LDCT lung cancer screening exam every year until you are 79, or are no longer willing or able to undergo screening and possible procedures to diagnose and treat lung cancer.  How effective is LDCT at preventing death from lung cancer?  Studies have shown that LDCT lung cancer screening can lower the risk of death from lung cancer by 20 percent in people who are at high-risk.  What are the risks?  There are some risks and limitations of LDCT lung cancer screening. We want to make sure you understand the risks and benefits, so please let us know if you have any questions. Your doctor may want to talk with you more about these risks.    Radiation exposure: As with any exam that uses radiation, there is a very small increased risk of cancer. The amount of radiation in LDCT is small--about the same amount a person would get from a mammogram. Your doctor orders the exam when he or she feels the potential benefits outweigh the risks.    False negatives: No test is perfect, including LDCT. It is possible that you may have a medical condition, including lung cancer, that is not found during your exam. This is called a false negative result.    False positives and more testing: LDCT very often finds  something in the lung that could be cancer, but in fact is not. This is called a false positive result. False positive tests often cause anxiety. To make sure these findings are not cancer, you may need to have more tests. These tests will be done only if you give us permission. Sometimes patients need a treatment that can have side effects, such as a biopsy. For more information on false positives, see  What can I expect from the results?     Findings not related to lung cancer: Your LDCT exam also takes pictures of areas of your body next to your lungs. In a very small number of cases, the CT scan will show an abnormal finding in one of these areas, such as your kidneys, adrenal glands, liver or thyroid. This finding may not be serious, but you may need more tests. Your doctor can help you decide what other tests you may need, if any.  What can I expect from the results?  About 1 out of 4 LDCT exams will find something that may need more tests. Most of the time, these findings are lung nodules. Lung nodules are very small collections of tissue in the lung. These nodules are very common, and the vast majority--more than 97 percent--are not cancer (benign). Most are normal lymph nodes or small areas of scarring from past infections.  But, if a small lung nodule is found to be cancer, the cancer can be cured more than 90 percent of the time. To know if the nodule is cancer, we may need to get more images before your next yearly screening exam. If the nodule has suspicious features (for example, it is large, has an odd shape or grows over time), we will refer you to a specialist for further testing.  Will my doctor also get the results?  Yes. Your doctor will get a copy of your results.  Is it okay to keep smoking now that there s a cancer screening exam?  No. Tobacco is one of the strongest cancer-causing agents. It causes not only lung cancer, but other cancers and cardiovascular (heart) diseases as well. The damage  caused by smoking builds over time. This means that the longer you smoke, the higher your risk of disease. While it is never too late to quit, the sooner you quit, the better.  Where can I find help to quit smoking?  The best way to prevent lung cancer is to stop smoking. If you have already quit smoking, congratulations and keep it up! For help on quitting smoking, please call [employer] at 9-579-QUITNOW (1-927.210.9611) or the American Cancer Society at 1-911.225.7351 to find local resources near you.  One-on-one health coaching:  If you d prefer to work individually with a health care provider on tobacco cessation, we offer:      Medication Therapy Management:  Our specially trained pharmacists work closely with you and your doctor to help you quit smoking.  Call [phone] or [phone] (toll free).    Patient Education   Preventive Care Advice   This is general advice given by our system to help you stay healthy. However, your care team may have specific advice just for you. Please talk to your care team about your preventive care needs.  Nutrition  Eat 5 or more servings of fruits and vegetables each day.  Try wheat bread, brown rice and whole grain pasta (instead of white bread, rice, and pasta).  Get enough calcium and vitamin D. Check the label on foods and aim for 100% of the RDA (recommended daily allowance).  Lifestyle  Exercise at least 150 minutes each week  (30 minutes a day, 5 days a week).  Do muscle strengthening activities 2 days a week. These help control your weight and prevent disease.  No smoking.  Wear sunscreen to prevent skin cancer.  Have a dental exam and cleaning every 6 months.  Yearly exams  See your health care team every year to talk about:  Any changes in your health.  Any medicines your care team has prescribed.  Preventive care, family planning, and ways to prevent chronic diseases.  Shots (vaccines)   HPV shots (up to age 26), if you've never had them before.  Hepatitis B  shots (up to age 59), if you've never had them before.  COVID-19 shot: Get this shot when it's due.  Flu shot: Get a flu shot every year.  Tetanus shot: Get a tetanus shot every 10 years.  Pneumococcal, hepatitis A, and RSV shots: Ask your care team if you need these based on your risk.  Shingles shot (for age 50 and up)  General health tests  Diabetes screening:  Starting at age 35, Get screened for diabetes at least every 3 years.  If you are younger than age 35, ask your care team if you should be screened for diabetes.  Cholesterol test: At age 39, start having a cholesterol test every 5 years, or more often if advised.  Bone density scan (DEXA): At age 50, ask your care team if you should have this scan for osteoporosis (brittle bones).  Hepatitis C: Get tested at least once in your life.  STIs (sexually transmitted infections)  Before age 24: Ask your care team if you should be screened for STIs.  After age 24: Get screened for STIs if you're at risk. You are at risk for STIs (including HIV) if:  You are sexually active with more than one person.  You don't use condoms every time.  You or a partner was diagnosed with a sexually transmitted infection.  If you are at risk for HIV, ask about PrEP medicine to prevent HIV.  Get tested for HIV at least once in your life, whether you are at risk for HIV or not.  Cancer screening tests  Cervical cancer screening: If you have a cervix, begin getting regular cervical cancer screening tests starting at age 21.  Breast cancer scan (mammogram): If you've ever had breasts, begin having regular mammograms starting at age 40. This is a scan to check for breast cancer.  Colon cancer screening: It is important to start screening for colon cancer at age 45.  Have a colonoscopy test every 10 years (or more often if you're at risk) Or, ask your provider about stool tests like a FIT test every year or Cologuard test every 3 years.  To learn more about your testing options, visit:    .  For help making a decision, visit:   https://bit.ly/im43302.  Prostate cancer screening test: If you have a prostate, ask your care team if a prostate cancer screening test (PSA) at age 55 is right for you.  Lung cancer screening: If you are a current or former smoker ages 50 to 80, ask your care team if ongoing lung cancer screenings are right for you.  For informational purposes only. Not to replace the advice of your health care provider. Copyright   2023 [employer]. All rights reserved. Clinically reviewed by the [facility]. [employer] [id number] - REV 01/24.

## 2024-11-05 NOTE — PROGRESS NOTES
Lung Cancer Screening Shared Decision Making Visit     Marvin Chavarria, a 58 year old male, is eligible for lung cancer screening    History   Smoking Status     Former     Packs/day: 1.00     Years: 30.00     Types: Cigarettes     Quit date: 5/1/2022   Smokeless Tobacco     Never       I have discussed with patient the risks and benefits of screening for lung cancer with low-dose CT.     The risks include:    radiation exposure: one low dose chest CT has as much ionizing radiation as about 15 chest x-rays, or 6 months of background radiation living in [place]      false positives: most findings/nodules are NOT cancer, but some might still require additional diagnostic evaluation, including biopsy    over-diagnosis: some slow growing cancers that might never have been clinically significant will be detected and treated unnecessarily     The benefit of early detection of lung cancer is contingent upon adherence to annual screening or more frequent follow up if indicated.     Furthermore, to benefit from screening, Marvin must be willing and able to undergo diagnostic procedures, if indicated. Although no specific guide is available for determining severity of comorbidities, it is reasonable to withhold screening in patients who have greater mortality risk from other diseases.     We did discuss that the best way to prevent lung cancer is to not smoke.    Some patients may value a numeric estimation of lung cancer risk when evaluating if lung cancer screening is right for them, here is one calculator:    ShouldIScreen

## 2024-11-05 NOTE — PROGRESS NOTES
Preventive Care Visit  [facility]  Lacho Lizama MD, Internal Medicine  Nov 5, 2024      1. Routine general medical examination at a health care facility (Primary)  Colon cancer screening up-to-date.  Had a polyp removed with 5-year follow-up recommended.  Likely recommend Cologuard next year we will discuss again then.    2. Mixed hyperlipidemia  Continue atorvastatin and due for lipid panel  - aspirin (ASPIRIN LOW DOSE) 81 MG EC tablet; Take 1 tablet (81 mg) by mouth daily.  Dispense: 90 tablet; Refill: 4  - Lipid Profile; Future  - Lipid Profile    3. 10 year risk of MI or stroke 7.5% or greater  - atorvastatin (LIPITOR) 10 MG tablet; Take 1 tablet (10 mg) by mouth daily.  Dispense: 90 tablet; Refill: 4    4. Gastroesophageal reflux disease with esophagitis, unspecified whether hemorrhage  At goal, no change.  - famotidine (PEPCID) 10 MG tablet; Take 1 tablet (10 mg) by mouth 2 times daily.  Dispense: 180 tablet; Refill: 4    5. Benign prostatic hyperplasia without lower urinary tract symptoms  No major change in prostate symptoms.  Continue tamsulosin as this appears to be effective.  If significant increase in PSA will recommend return to urology.  Warning signs were discussed.  - tamsulosin (FLOMAX) 0.4 MG capsule; Take 1 capsule (0.4 mg) by mouth daily. After a meal  Dispense: 90 capsule; Refill: 3  - Prostate Specific Antigen Screen; Future  - Prostate Specific Antigen Screen    6. Pre-diabetes  Lifestyle modification recommended including weight loss  - Hemoglobin A1c; Future  - Basic metabolic panel; Future  - Hemoglobin A1c  - Basic metabolic panel    7. Elevated prostate specific antigen (PSA)  - Prostate Specific Antigen Screen; Future  - Prostate Specific Antigen Screen    8. Benign prostatic hyperplasia with urinary frequency    9. Personal history of tobacco use  - Prof fee: Shared Decision Making for Lung Cancer Screening  - CT Chest Lung Cancer Scrn Low Dose wo;  Future    10. Centrilobular emphysema (H)  No symptoms consistent with COPD at this time.  Education provided.  Follow-up if concerning symptoms develop.  Applauded tobacco cessation.      Signed, Lacho Lizama MD, FAAP, FACP  Internal Medicine & Pediatrics      Subjective   Marvin is a 58 year old, presenting for the following:  Physical (annual)  Med management requested.           HPI        Health Care Directive  Patient does not have a Health Care Directive: Discussed advance care planning with patient; information given to patient to review.      11/5/2024   General Health   How would you rate your overall physical health? Good   Feel stress (tense, anxious, or unable to sleep) Only a little      (!) STRESS CONCERN      11/5/2024   Nutrition   Three or more servings of calcium each day? (!) NO   Diet: Regular (no restrictions)   How many servings of fruit and vegetables per day? (!) 0-1   How many sweetened beverages each day? 0-1            11/5/2024   Exercise   Days per week of moderate/strenous exercise 1 day   Average minutes spent exercising at this level 0 min      (!) EXERCISE CONCERN      11/5/2024   Social Factors   Frequency of gathering with friends or relatives Patient declined   Worry food won't last until get money to buy more No   Food not last or not have enough money for food? No   Do you have housing? (Housing is defined as stable permanent housing and does not include staying ouside in a car, in a tent, in an abandoned building, in an overnight shelter, or couch-surfing.) Yes   Are you worried about losing your housing? No   Lack of transportation? No   Unable to get utilities (heat,electricity)? No            11/5/2024   Fall Risk   Fallen 2 or more times in the past year? No    Trouble with walking or balance? No        Patient-reported          11/5/2024   Dental   Dentist two times every year? Yes            11/5/2024   TB Screening   Were you born outside of the [place]? No     "        Today's PHQ-2 Score:       2024     8:32 AM   PHQ-2 (  [employer])   Q1: Little interest or pleasure in doing things 0    Q2: Feeling down, depressed or hopeless 0    PHQ-2 Score 0    Q1: Little interest or pleasure in doing things Not at all   Q2: Feeling down, depressed or hopeless Not at all   PHQ-2 Score 0       Patient-reported           2024   Substance Use   Alcohol more than 3/day or more than 7/wk Not Applicable   Do you use any other substances recreationally? No        Social History     Tobacco Use    Smoking status: Former     Current packs/day: 0.00     Average packs/day: 1 pack/day for 30.0 years (30.0 ttl pk-yrs)     Types: Cigarettes     Start date: 1992     Quit date: 2022     Years since quittin.5    Smokeless tobacco: Never    Tobacco comments:     Quit May 2022   Vaping Use    Vaping status: Never Used   Substance Use Topics    Alcohol use: No     Alcohol/week: 0.0 standard drinks of alcohol    Drug use: Never           2024   STI Screening   New sexual partner(s) since last STI/HIV test? No      Last PSA:   PSA Tumor Marker   Date Value Ref Range Status   2023 8.16 (H) 0.00 - 3.50 ng/mL Final   2022 5.39 (H) 0.00 - 4.00 ug/L Final     ASCVD Risk   The 10-year ASCVD risk score (Sherrell MURRAY, et al., 2019) is: 4.3%    Values used to calculate the score:      Age: 58 years      Sex: Male      Is Non- : No      Diabetic: No      Tobacco smoker: No      Systolic Blood Pressure: 120 mmHg      Is BP treated: No      HDL Cholesterol: 57 mg/dL      Total Cholesterol: 147 mg/dL           Reviewed and updated as needed this visit by Provider         Fam Hx                     Objective    Exam  /82   Pulse 78   Temp 97.9  F (36.6  C) (Tympanic)   Resp 16   Ht 1.626 m (5' 4\")   Wt 75.5 kg (166 lb 6.4 oz)   SpO2 94%   BMI 28.56 kg/m     Estimated body mass index is 28.56 kg/m  as calculated from the following:    " "Height as of this encounter: 1.626 m (5' 4\").    Weight as of this encounter: 75.5 kg (166 lb 6.4 oz).    Physical Exam  Gen: Alert, NAD.  Neck: No carotid bruits  CV: RRR no m/r/g  Pulm: CTAB, no w/r/r. No increased work of breathing  Msk: No LE edema  Neuro: Grossly intact  Skin: No concerning lesions.  Psychiatric: Normal affect and insight. Does not appear anxious or depressed.          Signed Electronically by: Lacho Lizama MD    "

## 2024-11-22 ENCOUNTER — HOSPITAL ENCOUNTER (OUTPATIENT)
Dept: CT IMAGING | Facility: OTHER | Age: 59
Discharge: HOME | End: 2024-11-22
Attending: INTERNAL MEDICINE | Admitting: INTERNAL MEDICINE
Payer: COMMERCIAL

## 2024-11-22 DIAGNOSIS — Z87.891 PERSONAL HISTORY OF TOBACCO USE: ICD-10-CM

## 2024-11-22 PROCEDURE — 71271 CT THORAX LUNG CANCER SCR C-: CPT

## 2025-05-28 ENCOUNTER — TELEPHONE (OUTPATIENT)
Dept: SURGERY | Facility: OTHER | Age: 60
End: 2025-05-28
Payer: COMMERCIAL

## 2025-05-28 DIAGNOSIS — D12.6 TUBULAR ADENOMA OF COLON: Primary | ICD-10-CM

## 2025-05-28 NOTE — TELEPHONE ENCOUNTER
Screening Questions for the Scheduling of Screening Colonoscopies   (If Colonoscopy is diagnostic, Provider should review the chart before scheduling.)  Are you younger than 45 or older than 80?  NO  Do you take aspirin or fish oil?  ASPIRIN AND FISH OIL (if yes, tell patient to stop 1 week prior to Colonoscopy)  Do you take warfarin (Coumadin), clopidogrel (Plavix), apixaban (Eliquis), dabigatram (Pradaxa), rivaroxaban (Xarelto) or any blood thinner? NO  Do you take semaglutide (Ozempic or Wegovy), tirzepatide (Mounjaro or Zepbound), liraglutide (Victoza), or dulaglutide (Trulicity)? NO  Do you use oxygen or a CPAP at home?  NO  Do you have kidney disease? NO  Are you on dialysis? NO  Have you had a stroke or heart attack in the last year? NO  Have you had a stent in your heart or any blood vessel in the last year? NO  Have you had a transplant of any organ? NO  Have you had a colonoscopy or upper endoscopy (EGD) before? YES         When?  2020  Date of scheduled Colonoscopy. 07/24/2025  Provider [facility]  Pharmacy [facility]

## 2025-06-02 RX ORDER — BISACODYL 5 MG/1
TABLET, DELAYED RELEASE ORAL
Qty: 2 TABLET | Refills: 0 | Status: SHIPPED | OUTPATIENT
Start: 2025-07-17

## 2025-06-02 RX ORDER — POLYETHYLENE GLYCOL 3350, SODIUM CHLORIDE, SODIUM BICARBONATE, POTASSIUM CHLORIDE 420; 11.2; 5.72; 1.48 G/4L; G/4L; G/4L; G/4L
4000 POWDER, FOR SOLUTION ORAL ONCE
Qty: 4000 ML | Refills: 0 | Status: SHIPPED | OUTPATIENT
Start: 2025-07-17 | End: 2025-07-17

## 2025-07-24 ENCOUNTER — ANESTHESIA EVENT (OUTPATIENT)
Dept: SURGERY | Facility: OTHER | Age: 60
End: 2025-07-24
Payer: COMMERCIAL

## 2025-07-24 ENCOUNTER — ANESTHESIA (OUTPATIENT)
Dept: SURGERY | Facility: OTHER | Age: 60
End: 2025-07-24
Payer: COMMERCIAL

## 2025-07-24 ENCOUNTER — HOSPITAL ENCOUNTER (OUTPATIENT)
Facility: OTHER | Age: 60
Discharge: HOME | End: 2025-07-24
Attending: SURGERY | Admitting: SURGERY
Payer: COMMERCIAL

## 2025-07-24 VITALS
HEIGHT: 64 IN | RESPIRATION RATE: 14 BRPM | DIASTOLIC BLOOD PRESSURE: 80 MMHG | OXYGEN SATURATION: 94 % | BODY MASS INDEX: 28.34 KG/M2 | HEART RATE: 84 BPM | TEMPERATURE: 97.2 F | WEIGHT: 166 LBS | SYSTOLIC BLOOD PRESSURE: 124 MMHG

## 2025-07-24 PROCEDURE — 250N000009 HC RX 250

## 2025-07-24 PROCEDURE — 45385 COLONOSCOPY W/LESION REMOVAL: CPT | Mod: PT | Performed by: SURGERY

## 2025-07-24 PROCEDURE — 258N000003 HC RX IP 258 OP 636: Performed by: SURGERY

## 2025-07-24 PROCEDURE — 250N000011 HC RX IP 250 OP 636

## 2025-07-24 PROCEDURE — 88305 TISSUE EXAM BY PATHOLOGIST: CPT

## 2025-07-24 PROCEDURE — 999N000010 HC STATISTIC ANES STAT CODE-CRNA PER MINUTE: Performed by: SURGERY

## 2025-07-24 PROCEDURE — 45380 COLONOSCOPY AND BIOPSY: CPT | Performed by: SURGERY

## 2025-07-24 RX ORDER — FLUMAZENIL 0.1 MG/ML
0.2 INJECTION, SOLUTION INTRAVENOUS
Status: DISCONTINUED | OUTPATIENT
Start: 2025-07-24 | End: 2025-07-24 | Stop reason: HOSPADM

## 2025-07-24 RX ORDER — NALOXONE HYDROCHLORIDE 0.4 MG/ML
0.2 INJECTION, SOLUTION INTRAMUSCULAR; INTRAVENOUS; SUBCUTANEOUS
Status: DISCONTINUED | OUTPATIENT
Start: 2025-07-24 | End: 2025-07-24 | Stop reason: HOSPADM

## 2025-07-24 RX ORDER — LIDOCAINE HYDROCHLORIDE 20 MG/ML
INJECTION, SOLUTION INFILTRATION; PERINEURAL PRN
Status: DISCONTINUED | OUTPATIENT
Start: 2025-07-24 | End: 2025-07-24

## 2025-07-24 RX ORDER — LIDOCAINE 40 MG/G
CREAM TOPICAL
Status: DISCONTINUED | OUTPATIENT
Start: 2025-07-24 | End: 2025-07-24 | Stop reason: HOSPADM

## 2025-07-24 RX ORDER — SODIUM CHLORIDE, SODIUM LACTATE, POTASSIUM CHLORIDE, CALCIUM CHLORIDE 600; 310; 30; 20 MG/100ML; MG/100ML; MG/100ML; MG/100ML
INJECTION, SOLUTION INTRAVENOUS CONTINUOUS
Status: DISCONTINUED | OUTPATIENT
Start: 2025-07-24 | End: 2025-07-24 | Stop reason: HOSPADM

## 2025-07-24 RX ORDER — NALOXONE HYDROCHLORIDE 0.4 MG/ML
0.4 INJECTION, SOLUTION INTRAMUSCULAR; INTRAVENOUS; SUBCUTANEOUS
Status: DISCONTINUED | OUTPATIENT
Start: 2025-07-24 | End: 2025-07-24 | Stop reason: HOSPADM

## 2025-07-24 RX ORDER — PROPOFOL 10 MG/ML
INJECTION, EMULSION INTRAVENOUS CONTINUOUS PRN
Status: DISCONTINUED | OUTPATIENT
Start: 2025-07-24 | End: 2025-07-24

## 2025-07-24 RX ORDER — PROPOFOL 10 MG/ML
INJECTION, EMULSION INTRAVENOUS PRN
Status: DISCONTINUED | OUTPATIENT
Start: 2025-07-24 | End: 2025-07-24

## 2025-07-24 RX ADMIN — PROPOFOL 140 MCG/KG/MIN: 10 INJECTION, EMULSION INTRAVENOUS at 11:24

## 2025-07-24 RX ADMIN — LIDOCAINE HYDROCHLORIDE 60 MG: 20 INJECTION, SOLUTION INFILTRATION; PERINEURAL at 11:24

## 2025-07-24 RX ADMIN — SODIUM CHLORIDE, SODIUM LACTATE, POTASSIUM CHLORIDE, AND CALCIUM CHLORIDE: .6; .31; .03; .02 INJECTION, SOLUTION INTRAVENOUS at 10:19

## 2025-07-24 RX ADMIN — SODIUM CHLORIDE, SODIUM LACTATE, POTASSIUM CHLORIDE, AND CALCIUM CHLORIDE: .6; .31; .03; .02 INJECTION, SOLUTION INTRAVENOUS at 11:21

## 2025-07-24 RX ADMIN — PROPOFOL 50 MG: 10 INJECTION, EMULSION INTRAVENOUS at 11:24

## 2025-07-24 ASSESSMENT — ACTIVITIES OF DAILY LIVING (ADL)
ADLS_ACUITY_SCORE: 35

## 2025-07-24 ASSESSMENT — LIFESTYLE VARIABLES: TOBACCO_USE: 1

## 2025-07-24 NOTE — ANESTHESIA PREPROCEDURE EVALUATION
Anesthesia Pre-Procedure Evaluation    Patient: Marvin Chavarria   MRN: [id number] : 1965          Procedure : Procedure(s):  Colonoscopy         Past Medical History:   Diagnosis Date    Nicotine dependence, uncomplicated     No Comments Provided      Past Surgical History:   Procedure Laterality Date    BIOPSY PROSTATE TRANSRECTAL N/A 2020    Procedure: BIOPSY, PROSTATE, RECTAL APPROACH;  Surgeon: Daniel Costa MD;  Location: [facility] OR    COLONOSCOPY  2017    follow up , tubular adenoma    COLONOSCOPY N/A 2020 5 year follow-up new polyps. Procedure: COLONOSCOPY, WITH POLYPECTOMY AND BIOPSY;  Surgeon: Rachel Cardona MD;  Location: [facility] OR    EXTRACTION(S) DENTAL            Allergies   Allergen Reactions    Ibuprofen      Other reaction(s): Chest Pain      Social History     Tobacco Use    Smoking status: Former     Current packs/day: 0.00     Average packs/day: 1 pack/day for 30.0 years (30.0 ttl pk-yrs)     Types: Cigarettes     Start date: 1992     Quit date: 2022     Years since quitting: 3.2    Smokeless tobacco: Never    Tobacco comments:     Quit May 2022   Substance Use Topics    Alcohol use: No     Alcohol/week: 0.0 standard drinks of alcohol      Wt Readings from Last 1 Encounters:   25 75.3 kg (166 lb)        Anesthesia Evaluation   Pt has had prior anesthetic.     No history of anesthetic complications       ROS/MED HX  ENT/Pulmonary:     (+)                tobacco use,                        Neurologic:  - neg neurologic ROS     Cardiovascular: Comment: Bilateral Carotid bruits - no significant hemodynamic stenosis on US    (+) Dyslipidemia - -   -  - -                                      METS/Exercise Tolerance: >4 METS    Hematologic:  - neg hematologic  ROS     Musculoskeletal:  - neg musculoskeletal ROS     GI/Hepatic:     (+) GERD, Asymptomatic on medication,      bowel prep,            Renal/Genitourinary:     (+)        BPH,      Endo: Comment:  "Pre-diabetes       Psychiatric/Substance Use:  - neg psychiatric ROS     Infectious Disease:  - neg infectious disease ROS     Malignancy:  - neg malignancy ROS     Other:  - neg other ROS            Physical Exam  Airway  Mallampati: II  TM distance: >3 FB  Neck ROM: full  Upper bite lip test: I  Mouth opening: >= 4 cm    Cardiovascular - normal exam  Rhythm: regular  Rate: normal rate   Comments: Bilateral Carotid bruits - no significant hemodynamic stenosis on US  Dental   (+) Completely normal teeth  Comments: One missing upper left molar  No loose teeth       Pulmonary - normal examBreath sounds clear to auscultation        Neurological - normal exam  He appears awake, alert and oriented x3.    Other Findings       OUTSIDE LABS:  CBC:   Lab Results   Component Value Date    WBC 5.2 10/30/2023    HGB 14.4 10/30/2023    HGB 14.6 06/23/2017    HCT 44.2 10/30/2023    HCT 43.9 06/23/2017     10/30/2023     06/23/2017     BMP:   Lab Results   Component Value Date     11/05/2024     10/30/2023    POTASSIUM 4.5 11/05/2024    POTASSIUM 4.9 10/30/2023    CHLORIDE 107 11/05/2024    CHLORIDE 105 10/30/2023    CO2 25 11/05/2024    CO2 27 10/30/2023    BUN 18.5 11/05/2024    BUN 9.3 10/30/2023    CR 1.10 11/05/2024    CR 0.93 10/30/2023     (H) 11/05/2024     (H) 10/30/2023     COAGS: No results found for: \"PTT\", \"INR\", \"FIBR\"  POC: No results found for: \"BGM\", \"HCG\", \"HCGS\"  HEPATIC:   Lab Results   Component Value Date    ALBUMIN 4.4 07/14/2015    PROTTOTAL 7.2 07/14/2015    ALKPHOS 34 07/14/2015    BILITOTAL 0.7 07/14/2015    BILIDIRECT 0.14 07/14/2015     OTHER:   Lab Results   Component Value Date    A1C 6.0 (H) 11/05/2024    STEVE 9.2 11/05/2024    TSH 1.81 10/30/2023       Anesthesia Plan    ASA Status:  2      NPO Status: NPO Appropriate   Anesthesia Type: MAC.   Techniques and Equipment:       - Monitoring Plan: standard ASA monitoring     Consents    Anesthesia Plan(s) and " "associated risks, benefits, and realistic alternatives discussed. Questions answered and patient/representative(s) expressed understanding.     - Discussed: CRNA     - Discussed with:         - Pt is DNR/DNI Status: no DNR          Postoperative Care         Comments:                   LYNN Cunningham CRNA    I have reviewed the pertinent notes and labs in the chart from the past 30 days and (re)examined the patient.  Any updates or changes from those notes are reflected in this note.    Clinically Significant Risk Factors Present on Admission                 # Drug Induced Platelet Defect: home medication list includes an antiplatelet medication             # Overweight: Estimated body mass index is 28.49 kg/m  as calculated from the following:    Height as of this encounter: 1.626 m (5' 4\").    Weight as of this encounter: 75.3 kg (166 lb).                    "

## 2025-07-24 NOTE — ANESTHESIA POSTPROCEDURE EVALUATION
Patient: Marvin Chavarria    Procedure: Procedure(s):  COLONOSCOPY, WITH POLYPECTOMY       Anesthesia Type:  MAC    Note:  Disposition: Outpatient   Postop Pain Control: Uneventful            Sign Out: Well controlled pain   PONV: No   Neuro/Psych: Uneventful            Sign Out: Acceptable/Baseline neuro status   Airway/Respiratory: Uneventful            Sign Out: Acceptable/Baseline resp. status   CV/Hemodynamics: Uneventful            Sign Out: Acceptable CV status; No obvious hypovolemia; No obvious fluid overload   Other NRE: NONE   DID A NON-ROUTINE EVENT OCCUR? No       Last vitals:  Vitals Value Taken Time   /80 07/24/25 12:30   Temp 97.2  F (36.2  C) 07/24/25 12:30   Pulse 84 07/24/25 12:30   Resp 14 07/24/25 12:30   SpO2 91 % 07/24/25 12:39   Vitals shown include unfiled device data.    Electronically Signed By: LYNN Wang CRNA  July 24, 2025  3:08 PM

## 2025-07-24 NOTE — DISCHARGE INSTRUCTIONS
[facility]  Adult Discharge Orders & Instructions    ________________________________________________________________          For 12 hours after surgery  Get plenty of rest.  A responsible adult must stay with you for at least 12 hours after you leave the hospital.   You may feel lightheaded.  IF so, sit for a few minutes before standing.  Have someone help you get up.   You may have a slight fever. Call the doctor if your fever is over 101 F (38.3 C) (taken under the tongue) or lasts longer than 24 hours.  You may have a dry mouth, a sore throat, muscle aches or trouble sleeping.  These should go away after 24 hours.  Do not make important or legal decisions.  6.   Do not drive or use heavy equipment.  If you have weakness or tingling, don't drive or use heavy equipment until this feeling goes away.    To contact a doctor, call   [phone]_______________________

## 2025-07-24 NOTE — OP NOTE
PROCEDURE NOTE    SURGEON:Osiel Xavier MD    PRE-OP DIAGNOSIS:  Screening Colonoscopy      POST-OP DIAGNOSIS: Transverse Polyp    PROCEDURE:  Colonoscopy snare polypectomy    SPECIMEN:      ID Type Source Tests Collected by Time Destination   1 : TRANSVERSE COLON POLYP Polyp Large Intestine, Colon, Transverse SURGICAL PATHOLOGY EXAM Osiel Xavier MD 7/24/2025 11:35 AM        ANESTHESIA:  MAC CRNA Independent: Leidy Xavier APRN CRNA   Coverage requested    ESTIMATED BLOOD LOSS: none    COMPLICATIONS:  None    INDICATION FOR THE PROCEDURE: The patient is a 59 year old male. The patient presents with need for screening. I explained to the patient the risks, benefits and alternatives to screening colonoscopy for evaluating for cancer or polyps. We discussed the risks including bleeding, perforation, potential inability to reach the cecum and the risks of sedation. The patient's questions were answered and the patient wished to proceed. Informed consent paperwork was completed.    PROCEDURE: The patient was taken to the endoscopy suite. Appropriate monitors were attached. The patient was placed in the left lateral decubitus position. Timeout was performed confirming the patient's identity and procedure to be performed.  After appropriate sedation was confirmed, digital rectal exam was performed.  There was normal tone and no gross abnormality was noted.  The lubricated colonoscope was introduced into the anus the colon was insufflated with air. The prep quality was adequate. Under direct visualization the scope was advanced to the cecum. The mucosa of the colon was inspected while withdrawing the scope. One 8 mm hypervascular irregular path was removed via snare.  The scope was retroflexed in the rectum and the anorectal junction was inspected. No abnormalities were noted. The scope was returned to a neutral position and the colon was decompressed. The scope was removed. The patient tolerated the procedure with no  immediately apparent complication. The patient was taken to recovery in stable condition.    FOLLOW UP: RECOMMEND high fiber diet, will call with pathology results.     Osiel Xavier MD on 7/24/2025 at 11:42 AM

## 2025-07-24 NOTE — ANESTHESIA CARE TRANSFER NOTE
Patient: Marvin Chavarria    Procedure: Procedure(s):  COLONOSCOPY, WITH POLYPECTOMY       Diagnosis: Colon cancer screening [Z12.11]  Diagnosis Additional Information: No value filed.    Anesthesia Type:   MAC     Note:    Oropharynx: oropharynx clear of all foreign objects and spontaneously breathing  Level of Consciousness: awake  Oxygen Supplementation: face mask  Level of Supplemental Oxygen (L/min / FiO2): 6  Independent Airway: airway patency satisfactory and stable  Dentition: dentition unchanged  Vital Signs Stable: post-procedure vital signs reviewed and stable  Report to RN Given: handoff report given  Patient transferred to: Phase II    Handoff Report: Identifed the Patient, Identified the Reponsible Provider, Reviewed the pertinent medical history, Discussed the surgical course, Reviewed Intra-OP anesthesia mangement and issues during anesthesia, Set expectations for post-procedure period and Allowed opportunity for questions and acknowledgement of understanding      Vitals:  Vitals Value Taken Time   BP     Temp     Pulse     Resp     SpO2         Electronically Signed By: LYNN Haley CRNA  July 24, 2025  11:45 AM

## 2025-07-24 NOTE — H&P
PRE-PROCEDURE NOTE    CHIEFCOMPLAINT / REASON FOR PROCEDURE:  Screening for polyps and colorectal cancer.    PERTINENT HISTORY   Patient is due for colonoscopy 2020. Previous colonoscopy . No family history of colon polyps or colon cancer.    Past Medical History:   Diagnosis Date    Nicotine dependence, uncomplicated     No Comments Provided       Past Surgical History:   Procedure Laterality Date    BIOPSY PROSTATE TRANSRECTAL N/A 02/04/2020    Procedure: BIOPSY, PROSTATE, RECTAL APPROACH;  Surgeon: Daniel Costa MD;  Location: [facility] OR    COLONOSCOPY  07/2017    follow up 2020, tubular adenoma    COLONOSCOPY N/A 08/12/2020 2025 5 year follow-up new polyps. Procedure: COLONOSCOPY, WITH POLYPECTOMY AND BIOPSY;  Surgeon: Rachel Cardona MD;  Location: [facility] OR    EXTRACTION(S) DENTAL      1981         Other:  None  Bleeding tendencies: No     Relevant Family History:  None     Relevant Social History:  None     10 point ROS of systems including Constitutional, Eyes, Respiratory, Cardiovascular, Gastroenterology, Genitourinary, Integumentary, Muscularskeletal, Psychiatric were all negative except for pertinent positives noted in my HPI.      ALLERGIES/SENSITIVITIES:   Allergies   Allergen Reactions    Ibuprofen      Other reaction(s): Chest Pain        CURRENT MEDICATIONS:    Current Facility-Administered Medications   Medication Dose Route Frequency Provider Last Rate Last Admin    lactated ringers infusion   Intravenous Continuous Osiel Xavier MD 30 mL/hr at 07/24/25 1019 New Bag at 07/24/25 1019    lidocaine (LMX4) cream   Topical Q1H PRN Osiel Xavier MD        lidocaine 1 % 0.1-1 mL  0.1-1 mL Other Q1H PRN Osiel Xavier MD        sodium chloride (PF) 0.9% PF flush 3 mL  3 mL Intracatheter Q8H Osiel Xavier MD        sodium chloride (PF) 0.9% PF flush 3 mL  3 mL Intracatheter q1 min prn Osiel Xavier MD               PRE-SEDATION ASSESSMENT:    LUNGS:  CTA B/L, no wheezing or crackles.  Heart & CV:  RRR no  murmur.  Intact distal pulses, good cap refill.    Comment(s):      IMPRESSION: 59 year old male in need of screening colonoscopy.    PLAN:  I discussed screening colonoscopy with the patient. Anesthesia coverage requested.    Osiel Xavier MD    7/24/2025 10:29 AM

## 2025-07-24 NOTE — OR NURSING
Miguel Angel has been discharged to home at 1315 via ambulatory to private car accompanied by wife, Yuko and RN    Written discharge instructions were provided to pt and wife. Patient states their pain is 0/10, and denies any nausea or dizziness upon discharge.    Patient and adult caring for them verbalize understanding of discharge instructions including no driving until tomorrow and no longer taking narcotic pain medications - no operating mechanical equipment and no making any important decisions.They understand reason for discharge, and necessary follow-up appointments.

## 2025-07-30 LAB
PATH REPORT.COMMENTS IMP SPEC: NORMAL
PATH REPORT.FINAL DX SPEC: NORMAL
PATH REPORT.RELEVANT HX SPEC: NORMAL
PHOTO IMAGE: NORMAL

## (undated) DEVICE — SOL WATER 1500ML

## (undated) DEVICE — ENDO KIT COMPLIANCE DYKENDOCMPLY

## (undated) DEVICE — TUBING SUCTION 10'X3/16" N510

## (undated) DEVICE — TRANSRECTAL NEEDLE GUIDE DISPOSABLE

## (undated) DEVICE — ENDO SNARE POLYPECTOMY OVAL 15MM LOOP SD-240U-15

## (undated) DEVICE — PAD PERI INDIV WRAP 11" 2022A

## (undated) DEVICE — ENDO FORCEP ENDOJAW BIOPSY 2.8MMX230CM FB-220U

## (undated) DEVICE — SUCTION MANIFOLD NEPTUNE 2 SYS 4 PORT 0702-020-000

## (undated) DEVICE — ENDO BRUSH CHANNEL MASTER CLEANING 2-4.2MM BW-412T

## (undated) DEVICE — PAD CHUX UNDERPAD 30X36" P3036C

## (undated) DEVICE — LUBRICATING JELLY 2.7GM T00137

## (undated) DEVICE — ENDO TRAP POLYP E-TRAP 00711099

## (undated) DEVICE — NDL BX 18GAX25CM MC1825

## (undated) DEVICE — DRSG GAUZE 4X4" 3033

## (undated) DEVICE — DRAPE TOWEL TIME OUT BEACON REMINDER STRL 811

## (undated) RX ORDER — GENTAMICIN SULFATE 60 MG/50ML
INJECTION, SOLUTION INTRAVENOUS
Status: DISPENSED
Start: 2020-02-04

## (undated) RX ORDER — PROPOFOL 10 MG/ML
INJECTION, EMULSION INTRAVENOUS
Status: DISPENSED
Start: 2025-07-24

## (undated) RX ORDER — DEXAMETHASONE SODIUM PHOSPHATE 4 MG/ML
INJECTION, SOLUTION INTRA-ARTICULAR; INTRALESIONAL; INTRAMUSCULAR; INTRAVENOUS; SOFT TISSUE
Status: DISPENSED
Start: 2025-07-24

## (undated) RX ORDER — LIDOCAINE HYDROCHLORIDE 20 MG/ML
INJECTION, SOLUTION EPIDURAL; INFILTRATION; INTRACAUDAL; PERINEURAL
Status: DISPENSED
Start: 2020-02-04

## (undated) RX ORDER — PROPOFOL 10 MG/ML
INJECTION, EMULSION INTRAVENOUS
Status: DISPENSED
Start: 2020-02-04

## (undated) RX ORDER — LIDOCAINE HYDROCHLORIDE 20 MG/ML
INJECTION, SOLUTION EPIDURAL; INFILTRATION; INTRACAUDAL; PERINEURAL
Status: DISPENSED
Start: 2020-08-12

## (undated) RX ORDER — LIDOCAINE HYDROCHLORIDE 20 MG/ML
JELLY TOPICAL
Status: DISPENSED
Start: 2020-02-04

## (undated) RX ORDER — CEFTRIAXONE SODIUM 1 G/50ML
INJECTION, SOLUTION INTRAVENOUS
Status: DISPENSED
Start: 2020-02-04

## (undated) RX ORDER — PROPOFOL 10 MG/ML
INJECTION, EMULSION INTRAVENOUS
Status: DISPENSED
Start: 2020-08-12